# Patient Record
Sex: FEMALE | Race: BLACK OR AFRICAN AMERICAN | Employment: OTHER | URBAN - METROPOLITAN AREA
[De-identification: names, ages, dates, MRNs, and addresses within clinical notes are randomized per-mention and may not be internally consistent; named-entity substitution may affect disease eponyms.]

---

## 2019-07-08 ENCOUNTER — DOCUMENTATION ONLY (OUTPATIENT)
Dept: NEUROLOGY | Age: 34
End: 2019-07-08

## 2019-07-09 ENCOUNTER — OFFICE VISIT (OUTPATIENT)
Dept: NEUROLOGY | Age: 34
End: 2019-07-09

## 2019-07-09 VITALS
HEIGHT: 61 IN | SYSTOLIC BLOOD PRESSURE: 98 MMHG | DIASTOLIC BLOOD PRESSURE: 62 MMHG | HEART RATE: 87 BPM | TEMPERATURE: 98.4 F | OXYGEN SATURATION: 96 % | BODY MASS INDEX: 14.16 KG/M2 | WEIGHT: 75 LBS | RESPIRATION RATE: 16 BRPM

## 2019-07-09 DIAGNOSIS — G80.3 CEREBRAL PALSY, ATHETOID (HCC): Primary | ICD-10-CM

## 2019-07-09 RX ORDER — BACLOFEN 20 MG/1
TABLET ORAL
Refills: 4 | COMMUNITY
Start: 2019-06-18 | End: 2019-07-09 | Stop reason: SDUPTHER

## 2019-07-09 RX ORDER — CETIRIZINE HCL 10 MG
TABLET ORAL
COMMUNITY

## 2019-07-09 RX ORDER — DIAZEPAM 5 MG/1
5 TABLET ORAL
Qty: 90 TAB | Refills: 3 | Status: SHIPPED | OUTPATIENT
Start: 2019-07-09 | End: 2022-06-01 | Stop reason: SDUPTHER

## 2019-07-09 RX ORDER — SULFAMETHOXAZOLE AND TRIMETHOPRIM 800; 160 MG/1; MG/1
1 TABLET ORAL 2 TIMES DAILY
COMMUNITY

## 2019-07-09 RX ORDER — BACLOFEN 20 MG/1
TABLET ORAL
Qty: 90 TAB | Refills: 4 | Status: SHIPPED | OUTPATIENT
Start: 2019-07-09 | End: 2019-11-14 | Stop reason: SDUPTHER

## 2019-07-09 NOTE — PROGRESS NOTES
Chava Antonio is a 34 y.o., unknown handed female, with an established history of cerebral palsy. She doesn't have any seizures. 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 She has been getting Botox injections in all 4 limbs in the past but in the last couple of years only in the arms for her hygiene and dressing needs. The patient is a total care, with her mother performing all her ADLs. The patient is non-verbal and can communicate with blinking yes and no. She can operate her wheel chair with her head controls. she uses her own motorized chair. 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 Of note is she's been getting Botox injections for about 10 years. She's been getting the injections every 2 months for 2-3 years. Social History; single, doesn't smoke or drink. Disabled. Family History; mother alive and healthy. Father alive healthy. Siblings in good health. Current Outpatient Medications   Medication Sig Dispense Refill    baclofen (LIORESAL) 20 mg tablet TAKE 1 TABLET BY MOUTH THREE TIMES DAILY  4    trimethoprim-sulfamethoxazole (BACTRIM DS, SEPTRA DS) 160-800 mg per tablet Take 1 Tab by mouth two (2) times a day.  cetirizine (ZYRTEC) 10 mg tablet Take  by mouth. No past medical history on file. No past surgical history on file. Allergies   Allergen Reactions    Pcn [Penicillins] Hives       There is no problem list on file for this patient.         Review of Systems:   As above otherwise 11 point review of systems negative including;   Constitutional no fever or chills  Skin denies rash or itching  HENT  Denies tinnitus, hearing lose  Eyes denies diplopia vision lose  Respiratory denies shortness of breath  Cardiovascular denies chest pain, dyspnea on exertion  Gastrointestinal denies nausea, vomiting, diarrhea, constipation  Genitourinary denies incontinence  Musculoskeletal denies joint pain or swelling  Endocrine denies weight change  Hematology denies easy bruising or bleeding   Neurological as above in HPI      PHYSICAL EXAMINATION:      VITAL SIGNS:    Visit Vitals  BP 98/62 (BP 1 Location: Left arm, BP Patient Position: Sitting)   Pulse 87   Temp 98.4 °F (36.9 °C) (Oral)   Resp 16   Ht 5' 1\" (1.549 m)   Wt 34 kg (75 lb) Comment: wc, unable to weigh, mother stated   LMP 05/09/2019 (Within Weeks)   SpO2 96%   BMI 14.17 kg/m²       GENERAL: The patient is well developed, well nourished, and in no apparent distress. EXTREMITIES: No clubbing, cyanosis, or edema is identified. Pulses 2+ and symmetrical.  Muscle tone is abnormal, increased in all 4 limbs. HEAD:   Ear, nose, and throat appear to be without trauma. The patient is normocephalic. NEUROLOGIC EXAMINATION    MENTAL STATUS: The patient is awake, alert, and cooperative. She's non-verbal but easily follows 1 step commands. CRANIAL NERVES: II - Visual fields are full to confrontation. Funduscopic examination reveals flat disks bilaterally. Pupils are both 4 mm and briskly reactive to light and accommodation. III, IV, VI - Extraocular movements are intact and there is no nystagmus. V - Facial sensation is intact to pinprick and light touch. VII - Face is symmetrical.   VIII - Hearing is present. IX, X, XII- Palate rises symmetrically. Gag is present. Tongue is in the midline. XI - Shoulder shrugging and head turning intact  MOTOR:  The patient is wheelchair-bound. She has continuous choreoathetoid movements of her upper extremities and little and no movement of her lower extremities. She also has opisthotonic posturing of her head. Both of her upper extremities are essentially useless for movement. She has severe spasticity with flexion contractures at the elbow and extension contractures at the wrist.  The lower extremities are also noted to be spastic but a little less so. Muscle mass is decreased in all 4 extremities.   She also has a foreshortened left leg as a consequence of a hip removal due to osteo-myelitis in that region in the past.  Cerebellar and gait testing is impossible. 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 Seems relatively neurologically stable except that she has severe quadriparetic spasticity but makes dressing and delivery hygiene near possibility. She was given a baclofen pump for her lower extremity spasticity in the past but that became infected and needed to be removed in 2008. She continues to struggle with the spasticity. Plan: I am going to deliver Botox and require 500 units so I can deliver 450 units as per the protocol that was given to her in the past.  Apparently both brachialis muscles were delivered 120 units in both biceps brachii muscles were delivered 105 units for a total of 450 units of Botox being given. She will follow the protocol that was used in the past.  My only concern which I shared with the mom is that she was getting injections every 2 months not every 3 months. I think for now going to stick to every 3 months. She was given a refill of her baclofen. She was also given some Valium to help with the spasticity. I will see her back here for Botox injection. Thank you for allowing me to evaluate this unfortunate but pleasant individual.    PLEASE NOTE:   This document has been produced using voice recognition software. Unrecognized errors in transcription may be present.

## 2019-07-09 NOTE — PROGRESS NOTES
Carmenza Rivas is a 34 y.o. female new patient in today to establish care for cerebral palsy; referred by Malik Miller MD. Patient new to the area from [place]. Mother present at visit.

## 2019-07-09 NOTE — LETTER
7/9/19 Patient: Jessica Kelly YOB: 1985 Date of Visit: 7/9/2019 Viral Toledo MD 
[address] [facility] [address] [place] [address] VIA Facsimile: [phone] Dear Viral Toledo MD, Thank you for referring Ms. Jessica Kelly to [facility] for evaluation. My notes for this consultation are attached. If you have questions, please do not hesitate to call me. I look forward to following your patient along with you.  
 
 
Sincerely, 
 
Jaida Hudson MD

## 2019-07-17 ENCOUNTER — OFFICE VISIT (OUTPATIENT)
Dept: NEUROLOGY | Age: 34
End: 2019-07-17

## 2019-07-17 VITALS
SYSTOLIC BLOOD PRESSURE: 130 MMHG | OXYGEN SATURATION: 94 % | HEIGHT: 61 IN | BODY MASS INDEX: 14.16 KG/M2 | RESPIRATION RATE: 16 BRPM | WEIGHT: 75 LBS | DIASTOLIC BLOOD PRESSURE: 80 MMHG | HEART RATE: 67 BPM | TEMPERATURE: 98.7 F

## 2019-07-17 DIAGNOSIS — G81.10 SPASTIC HEMIPLEGIA DUE TO NONCEREBROVASCULAR ETIOLOGY, UNSPECIFIED LATERALITY (HCC): Primary | ICD-10-CM

## 2019-07-17 NOTE — PROGRESS NOTES
[address] AT [facility]  OFFICE PROCEDURE PROGRESS NOTE        Chart reviewed for the following:   Dariana Paredes MD, have reviewed the History, Physical and updated the Allergic reactions for KINGSTON Vásquez performed immediately prior to start of procedure:   Dariana Paredes MD, have performed the following reviews on Kyra Beck prior to the start of the procedure:            * Patient was identified by name and date of birth   * Agreement on procedure being performed was verified  * Risks and Benefits explained to the patient  * Procedure site verified and marked as necessary  * Patient was positioned for comfort  * Consent was signed and verified     Time: 2:15 PM    Date of procedure: 7/17/2019    Procedure performed by:  Brien Lagunas MD    Provider assisted by: the patient's mother     Patient assisted by: mother    How tolerated by patient: tolerated the procedure well with no complications    Post Procedural Pain Scale: 0 - No Hurt    Comments: none    Botox Procedure    Indications:  No diagnosis found. Last injection was April 2019, with relief, and now has a recurrence of spasticity of both upper limbs. Procedure: The medication was injected without EMG guidance as follows: Botox was prepared in the usual fashion using 1 mL of normal saline per 100 units of Botox. I reconstituted a total of 500 units of Botox. I injected the patient in both biceps on the right side and the left side with 60 units 2 injections each for a total of 120 units her biceps. I injected the left and right brachial radialis in one location with 50 and a second location with 55 units. A total of 105 units per brachioradialis. A total of 450 units of Botox was injected in a total of 50 units was wasted.   Outpatient Encounter Medications as of 7/17/2019   Medication Sig Dispense Refill    trimethoprim-sulfamethoxazole (BACTRIM DS, SEPTRA DS) 160-800 mg per tablet Take 1 Tab by mouth two (2) times a day.  cetirizine (ZYRTEC) 10 mg tablet Take  by mouth.  baclofen (LIORESAL) 20 mg tablet TAKE 1 TABLET BY MOUTH THREE TIMES DAILY 90 Tab 4    diazePAM (VALIUM) 5 mg tablet Take 1 Tab by mouth every six (6) hours as needed for Anxiety. Max Daily Amount: 20 mg. spasticity 90 Tab 3     No facility-administered encounter medications on file as of 7/17/2019.          The procedure was tolerated well with no complications

## 2019-07-17 NOTE — PROGRESS NOTES
Yara Lowe is a 30 y.o. female in today for Botox injections. 1. Have you been to the ER, urgent care clinic since your last visit? Hospitalized since your last visit? No    2. Have you seen or consulted any other health care providers outside of the [address] [place] [address] since your last visit? Include any pap smears or colon screening.  No

## 2019-08-13 ENCOUNTER — OFFICE VISIT (OUTPATIENT)
Dept: OBGYN CLINIC | Age: 34
End: 2019-08-13

## 2019-08-13 VITALS
OXYGEN SATURATION: 99 % | HEART RATE: 83 BPM | WEIGHT: 75 LBS | RESPIRATION RATE: 20 BRPM | HEIGHT: 61 IN | BODY MASS INDEX: 14.16 KG/M2

## 2019-08-13 DIAGNOSIS — R03.0 ELEVATED BLOOD PRESSURE READING IN OFFICE WITH WHITE COAT SYNDROME, WITHOUT DIAGNOSIS OF HYPERTENSION: ICD-10-CM

## 2019-08-13 DIAGNOSIS — N92.6 IRREGULAR PERIODS: Primary | ICD-10-CM

## 2019-08-13 RX ORDER — ACETAMINOPHEN 500 MG
1 TABLET ORAL DAILY
Qty: 1 KIT | Refills: 0 | Status: SHIPPED | OUTPATIENT
Start: 2019-08-13

## 2019-08-13 RX ORDER — NORGESTIMATE AND ETHINYL ESTRADIOL 0.25-0.035
1 KIT ORAL DAILY
Qty: 3 DOSE PACK | Refills: 3 | Status: SHIPPED | OUTPATIENT
Start: 2019-08-13 | End: 2019-09-10

## 2019-08-13 RX ORDER — NORGESTIMATE AND ETHINYL ESTRADIOL 0.25-0.035
1 KIT ORAL DAILY
COMMUNITY
End: 2019-08-13 | Stop reason: SDUPTHER

## 2019-08-13 NOTE — PROGRESS NOTES
30 minutes spent with patient and mom of which greater than 50 percent spent in counseling in regards to Trina's elevated blood pressure whenever she goes to a clinician's office and how this is termed white coat hypertension and is not considered pathologic when determining whether patient is an appropriate candidate for hormonal contraception to help regulate her irregular cycles. Will print out prescription for a wrist blood pressure cuff for mom to intermittently check patient's blood pressure for reassurance in continuing Sprintec therapy. Also counseled on the need to start cervical cancer screening despite no sexual activity and that patient should come back in one year for HPV indio screening and breast exam as mom would prefer the least invasive testing if possible that still suffices for accomplishing the age related cancer screening due to patient's neurological status.

## 2019-08-14 ENCOUNTER — OFFICE VISIT (OUTPATIENT)
Dept: NEUROLOGY | Age: 34
End: 2019-08-14

## 2019-08-14 VITALS
OXYGEN SATURATION: 98 % | WEIGHT: 75 LBS | DIASTOLIC BLOOD PRESSURE: 80 MMHG | SYSTOLIC BLOOD PRESSURE: 118 MMHG | BODY MASS INDEX: 14.16 KG/M2 | TEMPERATURE: 98.1 F | HEART RATE: 90 BPM | RESPIRATION RATE: 18 BRPM | HEIGHT: 61 IN

## 2019-08-14 DIAGNOSIS — G81.10 SPASTIC HEMIPLEGIA DUE TO NONCEREBROVASCULAR ETIOLOGY, UNSPECIFIED LATERALITY (HCC): Primary | ICD-10-CM

## 2019-08-14 DIAGNOSIS — G80.3 CEREBRAL PALSY, ATHETOID (HCC): ICD-10-CM

## 2019-08-14 NOTE — PROGRESS NOTES
Re:  Svitlana Ventura up visit     8/14/2019 11:54 AM    SSN: [id number]    Subjective:   Ruy Ledesma returns with her mom for follow up of her recent Botox injection. Mom did not notice any appreciable improvement in upper extremity spasticity. Medications:    Current Outpatient Medications   Medication Sig Dispense Refill    norgestimate-ethinyl estradiol (SPRINTEC, 28,) 0.25-35 mg-mcg tab Take 1 Tab by mouth daily for 28 days. 3 Dose Pack 3    Blood Pressure Test Kit-Wrist kit 1 Kit by Does Not Apply route daily. 1 Kit 0    trimethoprim-sulfamethoxazole (BACTRIM DS, SEPTRA DS) 160-800 mg per tablet Take 1 Tab by mouth two (2) times a day.  cetirizine (ZYRTEC) 10 mg tablet Take  by mouth.  baclofen (LIORESAL) 20 mg tablet TAKE 1 TABLET BY MOUTH THREE TIMES DAILY 90 Tab 4    diazePAM (VALIUM) 5 mg tablet Take 1 Tab by mouth every six (6) hours as needed for Anxiety. Max Daily Amount: 20 mg. spasticity 90 Tab 3       Vital signs:    Visit Vitals  /80 (BP 1 Location: Right arm, BP Patient Position: Sitting)   Pulse 90   Temp 98.1 °F (36.7 °C) (Oral)   Resp 18   Ht 5' 1\" (1.549 m)   Wt 34 kg (75 lb) Comment: wc, unable to stand, mother stated   LMP 08/13/2019 (Exact Date)   SpO2 98%   BMI 14.17 kg/m²       Review of Systems:   As above otherwise 11 point review of systems negative including;   Constitutional no fever or chills  Skin denies rash or itching  HEENT  Denies tinnitus, hearing lose  Eyes denies diplopia vision lose  Respiratory denies sortness of breath  Cardiovascular denies chest pain, dyspnea on exertion  Gastrointestinal denies nausea, vomiting, diarrhea, constipation  Genitourinary denies incontinence  Musculoskeletal denies joint pain or swelling  Endocrine denies weight change  Hematology denies easy bruising or bleeding   Neurological as above in HPI      There is no problem list on file for this patient.         Objective: MENTAL STATUS: The patient is awake, alert, and cooperative. She's non-verbal but easily follows 1 step commands. CRANIAL NERVES:   II - Visual fields are full to threat. Funduscopic examination reveals flat disks bilaterally. Pupils are both 4 mm and briskly reactive to light and accommodation. III, IV, VI - Extraocular movements are intact and there is no nystagmus. V - Facial sensation is intact to pinprick and light touch. VII - Face is symmetrical.   VIII - Hearing is present. IX, X, XII- Palate rises symmetrically. Gag is present. Tongue is in the midline. XI - Shoulder shrugging and head turning intact  MOTOR:          The patient is wheelchair-bound. She has continuous choreoathetoid movements of her upper extremities and little and no movement of her lower extremities. She also has opisthotonic posturing of her head. Both of her upper extremities are essentially useless for movement. She has severe spasticity with flexion contractures at the elbow and extension contractures at the wrist.  The lower extremities are also noted to be spastic but a little less so. Muscle mass is decreased in all 4 extremities. She also has a foreshortened left leg as a consequence of a hip removal due to osteo-myelitis in that region in the past.  Cerebellar and gait testing is impossible. CBC: No results found for: WBC, RBC, HGB, HCT, PLT, HGBEXT, HCTEXT, PLTEXT  BMP: No results found for: GLU, NA, K, CL, CO2, BUN, CREA, CA  CMP: No results found for: GLU, NA, K, CL, CO2, BUN, CREA, CA, AGAP, BUCR, TBIL, GPT, AP, TP, ALB, GLOB, AGRAT  Coagulation: No results found for: PTP, INR, APTT, PTTT    Assessment:  Severe quadriparetic cerebral palsy with spasticity of the arms. Botox wasn't helpful the last time. Plan: Will re-inject at 3 months. Sincerely,        Shashank Jung.  Delonte Ta M.D.

## 2019-09-18 ENCOUNTER — TELEPHONE (OUTPATIENT)
Dept: OBGYN CLINIC | Age: 34
End: 2019-09-18

## 2019-09-19 ENCOUNTER — TELEPHONE (OUTPATIENT)
Dept: OBGYN CLINIC | Age: 34
End: 2019-09-19

## 2019-09-19 NOTE — TELEPHONE ENCOUNTER
Malinda Slight calling Dr. Ortez Smooth office [phone] needing an office note from 08/13/19.     Fax [phone]

## 2019-10-16 ENCOUNTER — OFFICE VISIT (OUTPATIENT)
Dept: NEUROLOGY | Age: 34
End: 2019-10-16

## 2019-10-16 VITALS
WEIGHT: 75 LBS | BODY MASS INDEX: 14.16 KG/M2 | OXYGEN SATURATION: 96 % | TEMPERATURE: 98 F | DIASTOLIC BLOOD PRESSURE: 70 MMHG | HEART RATE: 68 BPM | SYSTOLIC BLOOD PRESSURE: 124 MMHG | RESPIRATION RATE: 20 BRPM | HEIGHT: 61 IN

## 2019-10-16 DIAGNOSIS — G81.10 SPASTIC HEMIPLEGIA DUE TO NONCEREBROVASCULAR ETIOLOGY, UNSPECIFIED LATERALITY (HCC): Primary | ICD-10-CM

## 2019-10-16 NOTE — PROGRESS NOTES
[address] AT [facility]  OFFICE PROCEDURE PROGRESS NOTE        Chart reviewed for the following:   Charlene Orozco MD, have reviewed the History, Physical and updated the Allergic reactions for KINGSTON Wren 75 performed immediately prior to start of procedure:   Charlene Orozco MD, have performed the following reviews on Brandan Torres prior to the start of the procedure:            * Patient was identified by name and date of birth   * Agreement on procedure being performed was verified  * Risks and Benefits explained to the patient  * Procedure site verified and marked as necessary  * Patient was positioned for comfort  * Consent was signed and verified     Time: 2:51 PM    Date of procedure: 10/16/2019    Procedure performed by:  Pauline Benedict MD    Provider assisted by: No one MA    Patient assisted by: self    How tolerated by patient: tolerated the procedure well with no complications    Post Procedural Pain Scale: 0 - No Hurt    Comments: none    Botox Procedure    Indications:  No diagnosis found. Last injection was July 2, 2019, with relief, and now has a recurrence of pain. Procedure: The medication was injected without EMG guidance as follows: Botox was prepared in usual fashion using 1 mL of normal saline per 100 units of Botox. I injected into the right and left biceps and 2 locations 60 units each for a total of 120 units. I injected the right and left brachial radialis and to location 6 units each. A total of 480 units were injected and 20 units were wasted. Outpatient Encounter Medications as of 10/16/2019   Medication Sig Dispense Refill    Blood Pressure Test Kit-Wrist kit 1 Kit by Does Not Apply route daily. 1 Kit 0    trimethoprim-sulfamethoxazole (BACTRIM DS, SEPTRA DS) 160-800 mg per tablet Take 1 Tab by mouth two (2) times a day.  cetirizine (ZYRTEC) 10 mg tablet Take  by mouth.       baclofen (LIORESAL) 20 mg tablet TAKE 1 TABLET BY MOUTH THREE TIMES DAILY 90 Tab 4    diazePAM (VALIUM) 5 mg tablet Take 1 Tab by mouth every six (6) hours as needed for Anxiety. Max Daily Amount: 20 mg. spasticity 90 Tab 3     No facility-administered encounter medications on file as of 10/16/2019.          The procedure was tolerated well with no complications

## 2019-11-14 DIAGNOSIS — G80.3 CEREBRAL PALSY, ATHETOID (HCC): ICD-10-CM

## 2019-11-14 NOTE — TELEPHONE ENCOUNTER
Requested Prescriptions     Pending Prescriptions Disp Refills    baclofen (LIORESAL) 20 mg tablet 90 Tab 4     Sig: TAKE 1 TABLET BY MOUTH THREE TIMES DAILY

## 2019-11-15 RX ORDER — BACLOFEN 20 MG/1
TABLET ORAL
Qty: 90 TAB | Refills: 4 | Status: SHIPPED | OUTPATIENT
Start: 2019-11-15 | End: 2020-03-26 | Stop reason: SDUPTHER

## 2020-02-18 ENCOUNTER — OFFICE VISIT (OUTPATIENT)
Dept: NEUROLOGY | Age: 35
End: 2020-02-18

## 2020-02-18 VITALS
WEIGHT: 75 LBS | OXYGEN SATURATION: 98 % | TEMPERATURE: 98.3 F | DIASTOLIC BLOOD PRESSURE: 92 MMHG | RESPIRATION RATE: 18 BRPM | HEART RATE: 77 BPM | HEIGHT: 61 IN | BODY MASS INDEX: 14.16 KG/M2 | SYSTOLIC BLOOD PRESSURE: 128 MMHG

## 2020-02-18 DIAGNOSIS — G80.3 CEREBRAL PALSY, ATHETOID (HCC): Primary | ICD-10-CM

## 2020-02-18 NOTE — PROGRESS NOTES
Suzie Redman is a 30 y.o. female in today accompanied by mother for Botox injections 500 units. 1. Have you been to the ER, urgent care clinic since your last visit? Hospitalized since your last visit? No    2. Have you seen or consulted any other health care providers outside of the [address] [place] [address] since your last visit? Include any pap smears or colon screening.  No

## 2020-02-18 NOTE — PROGRESS NOTES
[address] [facility]  OFFICE PROCEDURE PROGRESS NOTE        Chart reviewed for the following:   Fito Richardson MD, have reviewed the History, Physical and updated the Allergic reactions for KINGSTON Wren 75 performed immediately prior to start of procedure:   Fito Richardson MD, have performed the following reviews on Naye Marie prior to the start of the procedure:            * Patient was identified by name and date of birth   * Agreement on procedure being performed was verified  * Risks and Benefits explained to the patient  * Procedure site verified and marked as necessary  * Patient was positioned for comfort  * Consent was signed and verified     Time: 4:55 PM    Date of procedure: 2/18/2020    Procedure performed by:  Roseanne Yoo MD    Provider assisted by: No one     Patient assisted by: self    How tolerated by patient: tolerated the procedure well with no complications    Post Procedural Pain Scale: 0 - No Hurt    Comments: none    Botox Procedure    Indications:  No diagnosis found. Last injection was October 6, 2019, with relief, and now has a recurrence of severe spasticity of her upper extremities. Procedure: The medication was injected without EMG guidance as follows: Botox was prepared in usual fashion using 1 mL of normal saline per 100 units of Botox. I reconstituted a total of 500 units of Botox for this procedure. I injected both right and left biceps with 60 units in 2 locations for a total of 240 units. I injected both right and left brachial radialis with 55 units in 2 locations for a total of 220 units. A total of 460 units was injected and 40 units were wasted. The patient tolerated the procedure without any difficulty.   Outpatient Encounter Medications as of 2/18/2020   Medication Sig Dispense Refill    baclofen (LIORESAL) 20 mg tablet TAKE 1 TABLET BY MOUTH THREE TIMES DAILY 90 Tab 4    trimethoprim-sulfamethoxazole (BACTRIM DS, SEPTRA DS) 160-800 mg per tablet Take 1 Tab by mouth two (2) times a day.  cetirizine (ZYRTEC) 10 mg tablet Take  by mouth.  diazePAM (VALIUM) 5 mg tablet Take 1 Tab by mouth every six (6) hours as needed for Anxiety. Max Daily Amount: 20 mg. spasticity 90 Tab 3    Blood Pressure Test Kit-Wrist kit 1 Kit by Does Not Apply route daily. 1 Kit 0     No facility-administered encounter medications on file as of 2/18/2020.          The procedure was tolerated well with no complications

## 2020-03-26 DIAGNOSIS — G80.3 CEREBRAL PALSY, ATHETOID (HCC): ICD-10-CM

## 2020-03-26 RX ORDER — BACLOFEN 20 MG/1
TABLET ORAL
Qty: 90 TAB | Refills: 4 | Status: SHIPPED | OUTPATIENT
Start: 2020-03-26 | End: 2020-08-13 | Stop reason: SDUPTHER

## 2020-04-07 ENCOUNTER — VIRTUAL VISIT (OUTPATIENT)
Dept: OBGYN CLINIC | Age: 35
End: 2020-04-07

## 2020-04-07 DIAGNOSIS — N93.9 ABNORMAL UTERINE BLEEDING: Primary | ICD-10-CM

## 2020-04-07 DIAGNOSIS — B37.9 YEAST INFECTION: ICD-10-CM

## 2020-04-07 RX ORDER — FLUCONAZOLE 150 MG/1
150 TABLET ORAL DAILY
Qty: 3 TAB | Refills: 3 | Status: SHIPPED | OUTPATIENT
Start: 2020-04-07 | End: 2020-04-08

## 2020-04-07 RX ORDER — NORGESTIMATE AND ETHINYL ESTRADIOL 0.25-0.035
1 KIT ORAL DAILY
Qty: 4 PACKAGE | Refills: 4 | Status: SHIPPED | OUTPATIENT
Start: 2020-04-07

## 2020-04-07 NOTE — PROGRESS NOTES
Consent: Heron Mcduffie, who was seen by synchronous (real-time) audio-video technology, and/or her healthcare decision maker, is aware that this patient-initiated, Telehealth encounter on 4/7/2020 is a billable service, with coverage as determined by her insurance carrier. She is aware that she may receive a bill and has provided verbal consent to proceed: Yes. Assessment & Plan:   Diagnoses and all orders for this visit:    1. Abnormal uterine bleeding  -     norgestimate-ethinyl estradioL (ORTHO-CYCLEN, SPRINTEC) 0.25-35 mg-mcg tab; Take 1 Tab by mouth daily. Patient is to skip the placebo pills at the end of the pack and proceed directly to her new pack in order to take the oral contraception in a continuous fashion    2. Yeast infection  -     fluconazole (DIFLUCAN) 150 mg tablet; Take 1 Tab by mouth daily for 1 day. I spent at least 15 minutes with this established patient, and >50% of the time was spent counseling and/or coordinating care regarding her abnormal uterine bleeding and the fact that she would like to continue on the hormonal birth control pill to help control her cycles. PAtient states that she was doing great and would like to see if she could get a refill on her pills to be used in a continuous fashion and also to see if she can get a refill on her Diflucan to be used for a yeast infection. I cnfirmed with her that I will order both of these medications to her pharmacy. 712  Subjective:   Heron Mcduffie is a 30 y.o. female who was seen for Yeast Infection and Vaginitis      Prior to Admission medications    Medication Sig Start Date End Date Taking? Authorizing Provider   norgestimate-ethinyl estradioL (Ival Sicilian) 0.25-35 mg-mcg tab Take 1 Tab by mouth daily.  Patient is to skip the placebo pills at the end of the pack and proceed directly to her new pack in order to take the oral contraception in a continuous fashion 4/7/20  Yes Bel Martinez MD fluconazole (DIFLUCAN) 150 mg tablet Take 1 Tab by mouth daily for 1 day. 4/7/20 4/8/20 Yes Ava Rangel MD   baclofen (LIORESAL) 20 mg tablet TAKE 1 TABLET BY MOUTH THREE TIMES DAILY 3/26/20   Lencho Byrne MD   Blood Pressure Test Kit-Wrist kit 1 Kit by Does Not Apply route daily. 8/13/19   Estela Li MD   trimethoprim-sulfamethoxazole (BACTRIM DS, SEPTRA DS) 160-800 mg per tablet Take 1 Tab by mouth two (2) times a day. Provider, Historical   cetirizine (ZYRTEC) 10 mg tablet Take  by mouth. Provider, Historical   diazePAM (VALIUM) 5 mg tablet Take 1 Tab by mouth every six (6) hours as needed for Anxiety. Max Daily Amount: 20 mg. spasticity 7/9/19   Lencho Byrne MD     Allergies   Allergen Reactions    Pcn [Penicillins] Hives           Review of Systems   All other systems reviewed and are negative. Objective: There were no vitals taken for this visit. General: alert, cooperative, no distress   Mental  status: normal mood, behavior, speech, dress, motor activity, and thought processes, able to follow commands   HENT: NCAT   Neck: no visualized mass   Resp: no respiratory distress   Neuro: no gross deficits   Skin: no discoloration or lesions of concern on visible areas   Psychiatric: normal affect, consistent with stated mood, no evidence of hallucinations     Additional exam findings: We discussed the expected course, resolution and complications of the diagnosis(es) in detail. Medication risks, benefits, costs, interactions, and alternatives were discussed as indicated. I advised her to contact the office if her condition worsens, changes or fails to improve as anticipated. She expressed understanding with the diagnosis(es) and plan. Bao Dhillon is a 30 y.o. female being evaluated by a video visit encounter for concerns as above. A caregiver was present when appropriate.  Due to this being a TeleHealth encounter (During LXRKF-14 public health emergency), evaluation of the following organ systems was limited: Vitals/Constitutional/EENT/Resp/CV/GI//MS/Neuro/Skin/Heme-Lymph-Imm. Pursuant to the emergency declaration under the Formerly named [facility], [facility] waiver authority and the [employer] Act, this Virtual  Visit was conducted, with patient's (and/or legal guardian's) consent, to reduce the patient's risk of exposure to COVID-19 and provide necessary medical care. Services were provided through a video synchronous discussion virtually to substitute for in-person clinic visit. Patient and provider were located at their individual homes.         Alberto Hou MD

## 2020-05-18 ENCOUNTER — OFFICE VISIT (OUTPATIENT)
Dept: NEUROLOGY | Age: 35
End: 2020-05-18

## 2020-05-18 VITALS
RESPIRATION RATE: 22 BRPM | TEMPERATURE: 98.4 F | HEART RATE: 86 BPM | OXYGEN SATURATION: 99 % | WEIGHT: 75 LBS | HEIGHT: 61 IN | BODY MASS INDEX: 14.16 KG/M2

## 2020-05-18 DIAGNOSIS — M62.838 MUSCLE SPASTICITY: Primary | ICD-10-CM

## 2020-05-18 RX ORDER — ONABOTULINUMTOXINA 100 [USP'U]/1
300 INJECTION, POWDER, LYOPHILIZED, FOR SOLUTION INTRADERMAL; INTRAMUSCULAR ONCE
Qty: 300 UNITS | Refills: 0
Start: 2020-05-18 | End: 2020-05-18

## 2020-05-18 NOTE — PROGRESS NOTES
[address] AT [facility]  OFFICE PROCEDURE PROGRESS NOTE        Chart reviewed for the following:   Sheree Patterson MD, have reviewed the History, Physical and updated the Allergic reactions for KINGSTON Wren 75 performed immediately prior to start of procedure:   Sheree Patterson MD, have performed the following reviews on Juan Dove prior to the start of the procedure:            * Patient was identified by name and date of birth   * Agreement on procedure being performed was verified  * Risks and Benefits explained to the patient  * Procedure site verified and marked as necessary  * Patient was positioned for comfort  * Consent was signed and verified     Time: 1:32 PM    Date of procedure: 5/18/2020    Procedure performed by:  Lm Liu MD    Provider assisted by: No one     Patient assisted by: self    How tolerated by patient: tolerated the procedure well with no complications    Post Procedural Pain Scale: 0 - No Hurt    Comments: none  '  Botox Procedure    Indications:  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 Last injection was 2/20/2020 , with relief, and now has a recurrence of pain. Procedure: The medication was injected without EMG guidance as follows: Botox was prepared in usual fashion using 1 mL of normal saline per 100 units of Botox. I reconstituted a total of 500 units of Botox for this procedure. I injected both right and left biceps with 60 units in 2 locations for a total of 240 units. I injected both right and left brachial radialis with 55 units in 2 locations for a total of 220 units. A total of 460 units was injected and 40 units were wasted. The patient tolerated the procedure without any difficulty. Outpatient Encounter Medications as of 5/18/2020   Medication Sig Dispense Refill    norgestimate-ethinyl estradioL (ORTHO-CYCLEN, SPRINTEC) 0.25-35 mg-mcg tab Take 1 Tab by mouth daily.  Patient is to skip the placebo pills at the end of the pack and proceed directly to her new pack in order to take the oral contraception in a continuous fashion 4 Package 4    baclofen (LIORESAL) 20 mg tablet TAKE 1 TABLET BY MOUTH THREE TIMES DAILY 90 Tab 4    Blood Pressure Test Kit-Wrist kit 1 Kit by Does Not Apply route daily. 1 Kit 0    trimethoprim-sulfamethoxazole (BACTRIM DS, SEPTRA DS) 160-800 mg per tablet Take 1 Tab by mouth two (2) times a day.  cetirizine (ZYRTEC) 10 mg tablet Take  by mouth.  diazePAM (VALIUM) 5 mg tablet Take 1 Tab by mouth every six (6) hours as needed for Anxiety. Max Daily Amount: 20 mg. spasticity 90 Tab 3     No facility-administered encounter medications on file as of 5/18/2020.          The procedure was tolerated well with no complications

## 2020-08-10 ENCOUNTER — VIRTUAL VISIT (OUTPATIENT)
Dept: NEUROLOGY | Age: 35
End: 2020-08-10

## 2020-08-10 RX ORDER — TRIAMCINOLONE ACETONIDE 55 UG/1
1 SPRAY, METERED NASAL DAILY
COMMUNITY

## 2020-08-10 RX ORDER — LORATADINE 10 MG/1
10 TABLET ORAL DAILY
COMMUNITY

## 2020-08-10 NOTE — PROGRESS NOTES
Amy Hwang is a 37 y.o. female who's mother Juan Ramon Sepulveda will be conducting the patient virtual visit. 1. Have you been to the ER, urgent care clinic since your last visit? Hospitalized since your last visit? No    2. Have you seen or consulted any other health care providers outside of the [address] [place] [address] since your last visit? Include any pap smears or colon screening.  No        The cell phone number she'll be using is [phone]

## 2020-08-12 ENCOUNTER — DOCUMENTATION ONLY (OUTPATIENT)
Dept: NEUROLOGY | Age: 35
End: 2020-08-12

## 2020-08-12 NOTE — PROGRESS NOTES
Are you continuing patient's previous therapy of Botox 300 units as last treated by Dr. Donnita Goldmann?

## 2020-08-13 ENCOUNTER — OFFICE VISIT (OUTPATIENT)
Dept: NEUROLOGY | Age: 35
End: 2020-08-13

## 2020-08-13 VITALS
HEART RATE: 72 BPM | TEMPERATURE: 98.4 F | DIASTOLIC BLOOD PRESSURE: 80 MMHG | RESPIRATION RATE: 18 BRPM | BODY MASS INDEX: 14.16 KG/M2 | SYSTOLIC BLOOD PRESSURE: 130 MMHG | HEIGHT: 61 IN | WEIGHT: 75 LBS

## 2020-08-13 DIAGNOSIS — G80.0 SPASTIC QUADRIPARESIS SECONDARY TO CEREBRAL PALSY (HCC): ICD-10-CM

## 2020-08-13 DIAGNOSIS — G80.3 CEREBRAL PALSY, ATHETOID (HCC): Primary | ICD-10-CM

## 2020-08-13 RX ORDER — ONABOTULINUMTOXINA 100 [USP'U]/1
500 INJECTION, POWDER, LYOPHILIZED, FOR SOLUTION INTRADERMAL; INTRAMUSCULAR ONCE
Qty: 200 UNITS | Refills: 0
Start: 2020-08-13 | End: 2020-08-13

## 2020-08-13 RX ORDER — BACLOFEN 20 MG/1
TABLET ORAL
Qty: 270 TAB | Refills: 3 | Status: SHIPPED | OUTPATIENT
Start: 2020-08-13 | End: 2021-06-03 | Stop reason: SDUPTHER

## 2020-08-13 NOTE — PROGRESS NOTES
Armani Montes presents today for   Chief Complaint   Patient presents with    Spasms     follow up    Medication Refill     request 90 day supply Baclofen       Is someone accompanying this pt? Mom,    Is the patient using any DME equipment during OV? no    Depression Screening:  No flowsheet data found. Learning Assessment:  No flowsheet data found. Abuse Screening:  No flowsheet data found. Fall Risk  No flowsheet data found. Coordination of Care:  1. Have you been to the ER, urgent care clinic since your last visit? Hospitalized since your last visit? no    2. Have you seen or consulted any other health care providers outside of the [address] [place] [address] since your last visit? Include any pap smears or colon screening.  no

## 2020-08-13 NOTE — PROGRESS NOTES
2020 3:16 PM    SSN: [id number]    Subjective:   72-year-old female who has been a patient of Dr. 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 For approximately 3 years she has been getting botulinum toxin injection initially in all 4 limbs but lately in the arms and more specifically in the biceps and brachial radialis because she has an extreme flexion of both elbows that make it very difficult for her family/mother to help her with hygiene and dressing needs. She is communicative only by blinking, she is full care, and moves around in a motorized wheelchair. According to the mother the injections helped tremendously with the dressing, otherwise is this extremely difficult. Her last set of injections was in May and she started to get stiff again. Last time here she was injected as followed:    60 units into different locations in the right and left biceps for a total of 240 units  55 units in 2 different locations in the brachial radialis per arm for a total of 220 units  Total injected was 460 with 40 minutes of waste. This was not done under EMG guidance.     Social History     Socioeconomic History    Marital status: SINGLE     Spouse name: Not on file    Number of children: Not on file    Years of education: Not on file    Highest education level: Not on file   Occupational History    Not on file   Social Needs    Financial resource strain: Not on file    Food insecurity     Worry: Not on file     Inability: Not on file    Transportation needs     Medical: Not on file     Non-medical: Not on file   Tobacco Use    Smoking status: Never Smoker    Smokeless tobacco: Never Used   Substance and Sexual Activity    Alcohol use: Never     Frequency: Never    Drug use: Never    Sexual activity: Never   Lifestyle    Physical activity     Days per week: Not on file     Minutes per session: Not on file    Stress: Not on file   Relationships    Social connections     Talks on phone: Not on file     Gets together: Not on file     Attends Muslim service: Not on file     Active member of club or organization: Not on file     Attends meetings of clubs or organizations: Not on file     Relationship status: Not on file    Intimate partner violence     Fear of current or ex partner: Not on file     Emotionally abused: Not on file     Physically abused: Not on file     Forced sexual activity: Not on file   Other Topics Concern    Not on file   Social History Narrative    Not on file       History reviewed. No pertinent family history. Current Outpatient Medications   Medication Sig Dispense Refill    baclofen (LIORESAL) 20 mg tablet TAKE 1 TABLET BY MOUTH THREE TIMES DAILY 270 Tab 3    onabotulinumtoxinA (Botox) 100 unit injection 500 Units by IntraMUSCular route once for 1 dose. 200 Units 0    loratadine (CLARITIN) 10 mg tablet Take 10 mg by mouth daily.  triamcinolone (NASACORT AQ) 55 mcg nasal inhaler 1 [place] by Nasal route daily.  norgestimate-ethinyl estradioL (ORTHO-CYCLEN, SPRINTEC) 0.25-35 mg-mcg tab Take 1 Tab by mouth daily. Patient is to skip the placebo pills at the end of the pack and proceed directly to her new pack in order to take the oral contraception in a continuous fashion 4 Package 4    Blood Pressure Test Kit-Wrist kit 1 Kit by Does Not Apply route daily. 1 Kit 0    trimethoprim-sulfamethoxazole (BACTRIM DS, SEPTRA DS) 160-800 mg per tablet Take 1 Tab by mouth two (2) times a day.  cetirizine (ZYRTEC) 10 mg tablet Take  by mouth.  diazePAM (VALIUM) 5 mg tablet Take 1 Tab by mouth every six (6) hours as needed for Anxiety.  Max Daily Amount: 20 mg. spasticity 90 Tab 3       Past Medical History:   Diagnosis Date    Spasticity        Past Surgical History:   Procedure Laterality Date    HX ORTHOPAEDIC      hip removal       Allergies   Allergen Reactions    Pcn [Penicillins] Hives       Vital signs:    Visit Vitals  /80 (BP 1 Location: Right arm, BP Patient Position: Sitting)   Pulse 72   Temp 98.4 °F (36.9 °C)   Resp 18   Ht 5' 1\" (1.549 m)   Wt 34 kg (75 lb)   BMI 14.17 kg/m²       Review of Systems:   Unobtainable    EXAM: Alert, in NAD. Heart is regular. Quadriparetic, nonverbal, marked spasticity in all 4 limbs, with a very prominent spasticity on both elbows which are in a very tight and flexed position and difficult to extend with flexion contractures of the biceps tendon noticeable. 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 I will refill the latter. I will proceed with Botox injections with EMG guidance with the dose as mentioned above once we have all the necessary supplies and medications here. This will likely happen next week. PLEASE NOTE:   Portions of this document may have been produced using voice recognition software. Unrecognized errors in transcription may be present. This note will not be viewable in [address]

## 2020-08-27 ENCOUNTER — OFFICE VISIT (OUTPATIENT)
Dept: NEUROLOGY | Age: 35
End: 2020-08-27

## 2020-08-27 VITALS
RESPIRATION RATE: 16 BRPM | DIASTOLIC BLOOD PRESSURE: 68 MMHG | WEIGHT: 75 LBS | HEART RATE: 84 BPM | HEIGHT: 61 IN | TEMPERATURE: 98.1 F | BODY MASS INDEX: 14.16 KG/M2 | SYSTOLIC BLOOD PRESSURE: 110 MMHG

## 2020-08-27 DIAGNOSIS — G81.10 SPASTIC HEMIPLEGIA DUE TO NONCEREBROVASCULAR ETIOLOGY, UNSPECIFIED LATERALITY (HCC): Primary | ICD-10-CM

## 2020-08-27 DIAGNOSIS — G80.0 SPASTIC QUADRIPARESIS SECONDARY TO CEREBRAL PALSY (HCC): ICD-10-CM

## 2020-08-27 RX ORDER — ONABOTULINUMTOXINA 100 [USP'U]/1
500 INJECTION, POWDER, LYOPHILIZED, FOR SOLUTION INTRADERMAL; INTRAMUSCULAR ONCE
Qty: 100 UNITS | Refills: 0
Start: 2020-08-27 | End: 2020-08-27

## 2020-08-27 NOTE — PROGRESS NOTES
Botox Procedure    Indications: Bilateral elbow flexor spasticity    Procedure: Botulinum toxin type A 500 units to bilateral elbow flexors as below. After informed consent and following aseptic techniques 500 units of Botulinum Toxin Type A were diluted at 100units/2 cc of nss and injected UNDER EMG GUIDANCE USING A EMG INOJECT HOLLOW NEEDLE:    BICEPS   120 units divided in 2 sites on each arm (bilateral total 240 units)  BRACHIORADIALIS  110 units divided in 2 sites on each arm (bilateral total 220 units)    The procedure was tolerated well with no complications     40 units wasted. [address] AT [facility]  OFFICE PROCEDURE PROGRESS NOTE        Chart reviewed for the following:   Rudy Wolf MD, have reviewed the History, Physical and updated the Allergic reactions for Wenda Leer performed immediately prior to start of procedure:   Rudy Wolf MD, have performed the following reviews on [facility] prior to the start of the procedure:            * Patient was identified by name and date of birth   * Agreement on procedure being performed was verified  * Risks and Benefits explained to the patient  * Procedure site verified and marked as necessary  * Patient was positioned for comfort  * Consent was signed and verified     Time: 12:38 PM    Date of procedure: 8/27/2020    Procedure performed by:  Alex Smith MD    Provider assisted by: None    Patient assisted by: self    How tolerated by patient: tolerated the procedure well with no complications    Post Procedural Pain Scale: 0 - No Hurt    Comments: none    Lot [id number]: [id number]  Expires 04/2023   [address]  NDC: 5051-7797-59  Dosage: 460 units    Patient will be given a f/u appt in two months with Dr. Lyubov Mao.

## 2020-08-27 NOTE — PROGRESS NOTES
Amy Arias presents today for   Chief Complaint   Patient presents with    Procedure     Botox injections       Is someone accompanying this pt? Yes, mother    Is the patient using any DME equipment during [address]? no    Depression Screening:  No flowsheet data found. Learning Assessment:  No flowsheet data found. Abuse Screening:  No flowsheet data found. Fall Risk  No flowsheet data found. Coordination of Care:  1. Have you been to the ER, urgent care clinic since your last visit? Hospitalized since your last visit? no    2. Have you seen or consulted any other health care providers outside of the [address] [place] [address] since your last visit? Include any pap smears or colon screening.  no

## 2020-10-19 ENCOUNTER — OFFICE VISIT (OUTPATIENT)
Dept: NEUROLOGY | Age: 35
End: 2020-10-19
Payer: MEDICARE

## 2020-10-19 VITALS
TEMPERATURE: 96.1 F | HEIGHT: 61 IN | HEART RATE: 55 BPM | BODY MASS INDEX: 14.16 KG/M2 | WEIGHT: 75 LBS | SYSTOLIC BLOOD PRESSURE: 90 MMHG | RESPIRATION RATE: 22 BRPM | DIASTOLIC BLOOD PRESSURE: 70 MMHG | OXYGEN SATURATION: 73 %

## 2020-10-19 DIAGNOSIS — G80.3 CEREBRAL PALSY, ATHETOID (HCC): Primary | ICD-10-CM

## 2020-10-19 DIAGNOSIS — G80.0 SPASTIC QUADRIPARESIS SECONDARY TO CEREBRAL PALSY (HCC): ICD-10-CM

## 2020-10-19 PROCEDURE — 99213 OFFICE O/P EST LOW 20 MIN: CPT | Performed by: STUDENT IN AN ORGANIZED HEALTH CARE EDUCATION/TRAINING PROGRAM

## 2020-10-19 NOTE — PROGRESS NOTES
Cee Kelly is a 37 y.o. female . presents for Follow-up (spasticity)   . A 37years old female patient with cerebral palsy  spastic quadriparesis and nonverbal status here for follow-up evaluation. Used to follow with Dr. Celia Brown in the past for Botox injections. She was seen by Dr. Flory Suresh during her last visit. Her last Botox injection was on August 27, 2020. She was brought today by her mother. According to her mom, she used to get Botox since early childhood. The past used to get it over her upper or lower extremities. But recently she is getting it only over the upper extremities. She was given injections last time her biceps and brachial rectus muscles bilaterally. Used to get good relief since the past and able to stretch out her arms bilaterally. But after her last injection, mother mentions that her hyper extremity spasticity is about the same. No difficulty swallowing and no choking spells. Review of Systems   Reason unable to perform ROS: Patient is non verbal.   Constitutional: Negative for chills and fever. HENT: Negative for hearing loss. Eyes: Negative for blurred vision (tracks). Respiratory: Negative for cough and shortness of breath. Gastrointestinal: Negative for nausea and vomiting. Genitourinary:        ON diapers     Musculoskeletal:        Stiff UEs   Skin: Negative for itching and rash. Neurological: Positive for focal weakness (UE and LE). Negative for tremors and seizures. Past Medical History:   Diagnosis Date    Spasticity        Past Surgical History:   Procedure Laterality Date    HX ORTHOPAEDIC      hip removal        History reviewed. No pertinent family history.      Social History     Socioeconomic History    Marital status: SINGLE     Spouse name: Not on file    Number of children: Not on file    Years of education: Not on file    Highest education level: Not on file   Occupational History    Not on file   Social Needs    Financial resource strain: Not on file    Food insecurity     Worry: Not on file     Inability: Not on file    Transportation needs     Medical: Not on file     Non-medical: Not on file   Tobacco Use    Smoking status: Never Smoker    Smokeless tobacco: Never Used   Substance and Sexual Activity    Alcohol use: Never     Frequency: Never    Drug use: Never    Sexual activity: Never   Lifestyle    Physical activity     Days per week: Not on file     Minutes per session: Not on file    Stress: Not on file   Relationships    Social connections     Talks on phone: Not on file     Gets together: Not on file     Attends [employer] service: Not on file     Active member of club or organization: Not on file     Attends meetings of clubs or organizations: Not on file     Relationship status: Not on file    Intimate partner violence     Fear of current or ex partner: Not on file     Emotionally abused: Not on file     Physically abused: Not on file     Forced sexual activity: Not on file   Other Topics Concern    Not on file   Social History Narrative    Not on file        Allergies   Allergen Reactions    Pcn [Penicillins] Hives         Current Outpatient Medications   Medication Sig Dispense Refill    baclofen (LIORESAL) 20 mg tablet TAKE 1 TABLET BY MOUTH THREE TIMES DAILY 270 Tab 3    loratadine (CLARITIN) 10 mg tablet Take 10 mg by mouth daily.  triamcinolone (NASACORT AQ) 55 mcg nasal inhaler 1 [place] by Nasal route daily.  norgestimate-ethinyl estradioL (ORTHO-CYCLEN, SPRINTEC) 0.25-35 mg-mcg tab Take 1 Tab by mouth daily. Patient is to skip the placebo pills at the end of the pack and proceed directly to her new pack in order to take the oral contraception in a continuous fashion 4 Package 4    trimethoprim-sulfamethoxazole (BACTRIM DS, SEPTRA DS) 160-800 mg per tablet Take 1 Tab by mouth two (2) times a day.  cetirizine (ZYRTEC) 10 mg tablet Take  by mouth.       diazePAM (VALIUM) 5 mg tablet Take 1 Tab by mouth every six (6) hours as needed for Anxiety. Max Daily Amount: 20 mg. spasticity 90 Tab 3    Blood Pressure Test Kit-Wrist kit 1 Kit by Does Not Apply route daily. 1 Kit 0         Physical Exam  HENT:      Head: Normocephalic. Mouth/Throat:      Mouth: Mucous membranes are moist.      Pharynx: Oropharynx is clear. No oropharyngeal exudate. Pulmonary:      Effort: Pulmonary effort is normal. No respiratory distress. Musculoskeletal:      Comments: Spastic quadriparesis with contractures of the upper extremities at the elbow and wrist   Neurological:      Mental Status: She is alert. Comments: Mental status: Nonverbal; alert, unable to answer orientation questions; follows simple commands (opening her mouth and sticking out her tongue) . Speech and languge: Mute; following simple commands. CN: BTT bilaterally; ,EOMI, PERRL, , no facial asymmetry noted, palate elevation symmetric bilat, SS+SCM 5/5 , tongue midline  Motor: Spastic upper and lower extremities; some movement of the upper extremities and lower extremities. Coordination: FNF, HS accurate w/o dysmetria  DTR: Difficult to get because of contracture. Gait: Wheelchair bound. No visits with results within 3 Month(s) from this visit. Latest known visit with results is:   No results found for any previous visit. ICD-10-CM ICD-9-CM    1. Cerebral palsy, athetoid (HCC)  G80.3 333.71    2. 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 She has been on long-term Botox treatment for spasticity. Last Botox injection was August 27, 2020. She is getting the Botox over her upper extremities. The total last the dose was 500 units. Used to get good responses in terms of stretching out her arms after her Botox. Her next dose is in late November.   We will see her in 5 weeks time for the Botox.

## 2020-11-23 ENCOUNTER — OFFICE VISIT (OUTPATIENT)
Dept: NEUROLOGY | Age: 35
End: 2020-11-23
Payer: MEDICARE

## 2020-11-23 VITALS
WEIGHT: 75 LBS | HEIGHT: 61 IN | RESPIRATION RATE: 16 BRPM | HEART RATE: 88 BPM | TEMPERATURE: 97.2 F | DIASTOLIC BLOOD PRESSURE: 60 MMHG | BODY MASS INDEX: 14.16 KG/M2 | SYSTOLIC BLOOD PRESSURE: 102 MMHG

## 2020-11-23 DIAGNOSIS — G80.0 SPASTIC QUADRIPARESIS SECONDARY TO CEREBRAL PALSY (HCC): Primary | ICD-10-CM

## 2020-11-23 PROCEDURE — G0463 HOSPITAL OUTPT CLINIC VISIT: HCPCS | Performed by: STUDENT IN AN ORGANIZED HEALTH CARE EDUCATION/TRAINING PROGRAM

## 2020-11-23 PROCEDURE — 64642 CHEMODENERV 1 EXTREMITY 1-4: CPT | Performed by: STUDENT IN AN ORGANIZED HEALTH CARE EDUCATION/TRAINING PROGRAM

## 2020-11-23 RX ORDER — ONABOTULINUMTOXINA 100 [USP'U]/1
200 INJECTION, POWDER, LYOPHILIZED, FOR SOLUTION INTRADERMAL; INTRAMUSCULAR ONCE
Qty: 200 UNITS | Refills: 0
Start: 2020-11-23 | End: 2020-11-23

## 2020-11-23 NOTE — PROGRESS NOTES
Vineet Vines presents today for   Chief Complaint   Patient presents with    Procedure     Botox injections       Is someone accompanying this pt? Yes, Mother    Is the patient using any DME equipment during [address]? no    Depression Screening:  No flowsheet data found. Learning Assessment:  No flowsheet data found. Abuse Screening:  No flowsheet data found. Fall Risk  No flowsheet data found. Coordination of Care:  1. Have you been to the ER, urgent care clinic since your last visit? Hospitalized since your last visit? no    2. Have you seen or consulted any other health care providers outside of the [address] [place] [address] since your last visit? Include any pap smears or colon screening.  no

## 2020-11-24 NOTE — PROGRESS NOTES
[address] AT [facility]  OFFICE PROCEDURE PROGRESS NOTE        Chart reviewed for the following:   Kanchan Obrien MD, have reviewed the History, Physical and updated the Allergic reactions for K[place]ST[place] Wren 75 performed immediately prior to start of procedure:   Kanchan Obrien MD, have performed the following reviews on Asha Thorpe prior to the start of the procedure:            * Patient was identified by name and date of birth   * Agreement on procedure being performed was verified  * Risks and Benefits explained to the patient  * Procedure site verified and marked as necessary  * Patient was positioned for comfort  * Consent was signed and verified     Time: 12:00 PM    Date of procedure: 11/23/2020    Procedure performed by:  Zach Siegel MD    Provider assisted by: No one     Patient assisted by: Mother    How tolerated by patient: tolerated the procedure well with no complications    Post Procedural Pain Scale: 0 - No Hurt    Comments: none    Lot Number: [id number](200 Unit vials) and [id number] of the 100 units vials  Expires 08/2023(200 units) and 06/2023 of the 100 unit. [facility]  NDC: 1470-0498-43(200unit vials) and [id number]. Dosage: 460 units  '  Botox Procedure    Indications:  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 Last injection was 8/27/2020. Mother claims that she didn't see any benefit from the last time injection with no significant change in the elbow spasticity. Takes PRN diazepam for worsening symptoms. Will increase the Biceps dose and will give her a total of 500 units as she has no significant side effect from the previous 460 mg doses. Procedure: The medication was injected without EMG guidance as follows: Botox was prepared in usual fashion using 1 mL of normal saline per 100 units of Botox(used 2 200 unit Botox and 1 100 unit botox).   I reconstituted a total of 500 units of Botox for this procedure. I injected both right and left biceps with 50 units in 4 locations(each) for a total of 400 units. I injected both right and left brachial radialis with 50 units in 2 locations for a total of 100 units. A total of 500 units was injected. The patient tolerated the procedure without any difficulty. Outpatient Encounter Medications as of 11/23/2020   Medication Sig Dispense Refill    baclofen (LIORESAL) 20 mg tablet TAKE 1 TABLET BY MOUTH THREE TIMES DAILY 270 Tab 3    loratadine (CLARITIN) 10 mg tablet Take 10 mg by mouth daily.  triamcinolone (NASACORT AQ) 55 mcg nasal inhaler 1 [place] by Nasal route daily.  norgestimate-ethinyl estradioL (ORTHO-CYCLEN, SPRINTEC) 0.25-35 mg-mcg tab Take 1 Tab by mouth daily. Patient is to skip the placebo pills at the end of the pack and proceed directly to her new pack in order to take the oral contraception in a continuous fashion 4 Package 4    Blood Pressure Test Kit-Wrist kit 1 Kit by Does Not Apply route daily. 1 Kit 0    trimethoprim-sulfamethoxazole (BACTRIM DS, SEPTRA DS) 160-800 mg per tablet Take 1 Tab by mouth two (2) times a day.  cetirizine (ZYRTEC) 10 mg tablet Take  by mouth.  diazePAM (VALIUM) 5 mg tablet Take 1 Tab by mouth every six (6) hours as needed for Anxiety. Max Daily Amount: 20 mg. spasticity 90 Tab 3     No facility-administered encounter medications on file as of 11/23/2020.          The procedure was tolerated well with no complications

## 2021-03-02 ENCOUNTER — OFFICE VISIT (OUTPATIENT)
Dept: NEUROLOGY | Age: 36
End: 2021-03-02
Payer: MEDICARE

## 2021-03-02 VITALS
HEART RATE: 109 BPM | BODY MASS INDEX: 14.16 KG/M2 | TEMPERATURE: 99.5 F | HEIGHT: 61 IN | WEIGHT: 75 LBS | OXYGEN SATURATION: 99 % | RESPIRATION RATE: 22 BRPM

## 2021-03-02 DIAGNOSIS — G80.0 SPASTIC QUADRIPARESIS SECONDARY TO CEREBRAL PALSY (HCC): Primary | ICD-10-CM

## 2021-03-02 PROCEDURE — G8427 DOCREV CUR MEDS BY ELIG CLIN: HCPCS | Performed by: STUDENT IN AN ORGANIZED HEALTH CARE EDUCATION/TRAINING PROGRAM

## 2021-03-02 PROCEDURE — G8419 CALC BMI OUT NRM PARAM NOF/U: HCPCS | Performed by: STUDENT IN AN ORGANIZED HEALTH CARE EDUCATION/TRAINING PROGRAM

## 2021-03-02 PROCEDURE — G8432 DEP SCR NOT DOC, RNG: HCPCS | Performed by: STUDENT IN AN ORGANIZED HEALTH CARE EDUCATION/TRAINING PROGRAM

## 2021-03-02 PROCEDURE — 64645 CHEMODENERV 1 EXTREM 5/> EA: CPT | Performed by: STUDENT IN AN ORGANIZED HEALTH CARE EDUCATION/TRAINING PROGRAM

## 2021-03-02 PROCEDURE — G0463 HOSPITAL OUTPT CLINIC VISIT: HCPCS | Performed by: STUDENT IN AN ORGANIZED HEALTH CARE EDUCATION/TRAINING PROGRAM

## 2021-03-04 RX ORDER — ONABOTULINUMTOXINA 100 [USP'U]/1
200 INJECTION, POWDER, LYOPHILIZED, FOR SOLUTION INTRADERMAL; INTRAMUSCULAR ONCE
Qty: 200 UNITS | Refills: 0 | Status: SHIPPED | OUTPATIENT
Start: 2021-03-04 | End: 2021-03-04 | Stop reason: SDUPTHER

## 2021-03-04 RX ORDER — ONABOTULINUMTOXINA 100 [USP'U]/1
200 INJECTION, POWDER, LYOPHILIZED, FOR SOLUTION INTRADERMAL; INTRAMUSCULAR ONCE
Qty: 200 UNITS | Refills: 0
Start: 2021-03-04 | End: 2021-03-04 | Stop reason: SDUPTHER

## 2021-03-05 NOTE — PROGRESS NOTES
[address] AT [facility]  OFFICE PROCEDURE PROGRESS NOTE        Chart reviewed for the following:   Manda Vincent MD, have reviewed the History, Physical and updated the Allergic reactions for KINGSTON Vásquez performed immediately prior to start of procedure:   Manda Vincent MD, have performed the following reviews on Mar Halsted prior to the start of the procedure:            * Patient was identified by name and date of birth   * Agreement on procedure being performed was verified  * Risks and Benefits explained to the patient  * Procedure site verified and marked as necessary  * Patient was positioned for comfort  * Consent was signed and verified     Time: 12:00 PM    Date of procedure: 3/4/2021    Procedure performed by:  Megan Epstein MD    Provider assisted by: No one     Patient assisted by: Mother    How tolerated by patient: tolerated the procedure well with no complications    Post Procedural Pain Scale: 0 - No Hurt    Comments: none    NDC: 6790-2752-33(200unit vials) and 8829-3159-12. 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 Last injection was 8/27/2020. Mother claims that She improves and moves her elbows and fingers with the injections at the elbow flexors. Some worsening spasticity before her next injections. Takes PRN diazepam for worsening symptoms. She was given a total of 500 units during her last visit; no major side effects. Procedure: The medication was injected without EMG guidance as follows: Botox was prepared in usual fashion using 1 mL of normal saline per 100 units of Botox(used 2 200 unit Botox and 1 100 unit botox). Reconstituted a total of 500 units of Botox for this procedure. I injected both right and left biceps with 50 units in 4 locations(each) for a total of 400 units.   I injected both right and left brachial radialis with 50 units in 2 locations for a total of 100 units. A total of 500 units was injected. The patient tolerated the procedure without any difficulty. Outpatient Encounter Medications as of 3/2/2021   Medication Sig Dispense Refill    baclofen (LIORESAL) 20 mg tablet TAKE 1 TABLET BY MOUTH THREE TIMES DAILY 270 Tab 3    loratadine (CLARITIN) 10 mg tablet Take 10 mg by mouth daily.  triamcinolone (NASACORT AQ) 55 mcg nasal inhaler 1 [place] by Nasal route daily.  norgestimate-ethinyl estradioL (ORTHO-CYCLEN, SPRINTEC) 0.25-35 mg-mcg tab Take 1 Tab by mouth daily. Patient is to skip the placebo pills at the end of the pack and proceed directly to her new pack in order to take the oral contraception in a continuous fashion 4 Package 4    Blood Pressure Test Kit-Wrist kit 1 Kit by Does Not Apply route daily. 1 Kit 0    trimethoprim-sulfamethoxazole (BACTRIM DS, SEPTRA DS) 160-800 mg per tablet Take 1 Tab by mouth two (2) times a day.  cetirizine (ZYRTEC) 10 mg tablet Take  by mouth.  diazePAM (VALIUM) 5 mg tablet Take 1 Tab by mouth every six (6) hours as needed for Anxiety. Max Daily Amount: 20 mg. spasticity 90 Tab 3     No facility-administered encounter medications on file as of 3/2/2021.          The procedure was tolerated well with no complications

## 2021-06-03 ENCOUNTER — OFFICE VISIT (OUTPATIENT)
Dept: NEUROLOGY | Age: 36
End: 2021-06-03
Payer: MEDICARE

## 2021-06-03 ENCOUNTER — DOCUMENTATION ONLY (OUTPATIENT)
Dept: NEUROLOGY | Age: 36
End: 2021-06-03

## 2021-06-03 VITALS
DIASTOLIC BLOOD PRESSURE: 64 MMHG | TEMPERATURE: 98.1 F | HEIGHT: 61 IN | WEIGHT: 75 LBS | OXYGEN SATURATION: 95 % | SYSTOLIC BLOOD PRESSURE: 90 MMHG | RESPIRATION RATE: 22 BRPM | HEART RATE: 83 BPM | BODY MASS INDEX: 14.16 KG/M2

## 2021-06-03 DIAGNOSIS — G80.0 SPASTIC QUADRIPARESIS SECONDARY TO CEREBRAL PALSY (HCC): Primary | ICD-10-CM

## 2021-06-03 DIAGNOSIS — G80.3 CEREBRAL PALSY, ATHETOID (HCC): ICD-10-CM

## 2021-06-03 PROCEDURE — 64643 CHEMODENERV 1 EXTREM 1-4 EA: CPT | Performed by: STUDENT IN AN ORGANIZED HEALTH CARE EDUCATION/TRAINING PROGRAM

## 2021-06-03 PROCEDURE — G8427 DOCREV CUR MEDS BY ELIG CLIN: HCPCS | Performed by: STUDENT IN AN ORGANIZED HEALTH CARE EDUCATION/TRAINING PROGRAM

## 2021-06-03 PROCEDURE — 64642 CHEMODENERV 1 EXTREMITY 1-4: CPT | Performed by: STUDENT IN AN ORGANIZED HEALTH CARE EDUCATION/TRAINING PROGRAM

## 2021-06-03 PROCEDURE — 96372 THER/PROPH/DIAG INJ SC/IM: CPT | Performed by: STUDENT IN AN ORGANIZED HEALTH CARE EDUCATION/TRAINING PROGRAM

## 2021-06-03 PROCEDURE — G8432 DEP SCR NOT DOC, RNG: HCPCS | Performed by: STUDENT IN AN ORGANIZED HEALTH CARE EDUCATION/TRAINING PROGRAM

## 2021-06-03 PROCEDURE — G8419 CALC BMI OUT NRM PARAM NOF/U: HCPCS | Performed by: STUDENT IN AN ORGANIZED HEALTH CARE EDUCATION/TRAINING PROGRAM

## 2021-06-03 PROCEDURE — G0463 HOSPITAL OUTPT CLINIC VISIT: HCPCS | Performed by: STUDENT IN AN ORGANIZED HEALTH CARE EDUCATION/TRAINING PROGRAM

## 2021-06-03 RX ORDER — BACLOFEN 20 MG/1
TABLET ORAL
Qty: 270 TABLET | Refills: 2 | Status: SHIPPED | OUTPATIENT
Start: 2021-06-03 | End: 2021-08-23 | Stop reason: SDUPTHER

## 2021-06-03 RX ORDER — ONABOTULINUMTOXINA 100 [USP'U]/1
500 INJECTION, POWDER, LYOPHILIZED, FOR SOLUTION INTRADERMAL; INTRAMUSCULAR ONCE
Qty: 200 UNITS | Refills: 0
Start: 2021-06-03 | End: 2021-06-03

## 2021-06-03 NOTE — PROGRESS NOTES
[address] AT [facility]  OFFICE PROCEDURE PROGRESS NOTE        Chart reviewed for the following:   Angeli Tyson MD, have reviewed the History, Physical and updated the Allergic reactions for KINGSTON Wren 75 performed immediately prior to start of procedure:   Angeli Tyson MD, have performed the following reviews on Roger Byrne prior to the start of the procedure:            * Patient was identified by name and date of birth   * Agreement on procedure being performed was verified  * Risks and Benefits explained to the patient  * Procedure site verified and marked as necessary  * Patient was positioned for comfort  * Consent was signed and verified     Time: 11:20 Am    Date of procedure: 6/3/2021    Procedure performed by:  Milly Moyer MD    Provider assisted by: No one     Patient assisted by: Mother    How tolerated by patient: tolerated the procedure well with no complications    Post Procedural Pain Scale: 0 - No Hurt    Comments: none    '  Botox Procedure    Indications:  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 Last injection was 8/27/2020. Mother claims that she can move her upper extremities at the elbow better after her Botox injections and tends to get more and more stiffer a couple of weeks before her next injection. On exam, her upper extremities are held in flexion at the elbow, wrist, fingers. Some difficulty extending them. Lower extremities also are stiff with slight movement. Patient nonverbal but follows some commands. In addition to Botox, she is on baclofen 20 mg p.o. 3 times daily. She is getting a total of 500 units of Botox over the upper extremities over the brachioradialis and biceps. Review of Systems   Reason unable to perform ROS: Limted by her clinical condition. Constitutional: Negative for chills and fever. HENT: Negative for hearing loss. Eyes: Negative for blurred vision.    Respiratory: Negative for cough and shortness of breath. Gastrointestinal: Negative for vomiting. Musculoskeletal:        Spastic quadriparesis   Skin: Negative for itching and rash. Neurological: Positive for weakness (UE and LE). Procedure: The medication was injected without EMG guidance as follows: Botox was prepared in usual fashion using 1 mL of normal saline per 50 units of Botox(used 2 of 200 unit Botox and 1 of 100 unit botox). Reconstituted a total of 500 units of Botox for this procedure. I injected both right and left biceps with 50 units in 4 locations(each) for a total of 400 units. I injected both right and left brachial radialis with 50 units in 4 locations for a total of 100 units. A total of 500 units was injected. The patient tolerated the procedure without any difficulty. Outpatient Encounter Medications as of 6/3/2021   Medication Sig Dispense Refill    baclofen (LIORESAL) 20 mg tablet TAKE 1 TABLET BY MOUTH THREE TIMES DAILY 270 Tab 3    loratadine (CLARITIN) 10 mg tablet Take 10 mg by mouth daily.  triamcinolone (NASACORT AQ) 55 mcg nasal inhaler 1 [place] by Nasal route daily.  norgestimate-ethinyl estradioL (ORTHO-CYCLEN, SPRINTEC) 0.25-35 mg-mcg tab Take 1 Tab by mouth daily. Patient is to skip the placebo pills at the end of the pack and proceed directly to her new pack in order to take the oral contraception in a continuous fashion 4 Package 4    Blood Pressure Test Kit-Wrist kit 1 Kit by Does Not Apply route daily. 1 Kit 0    trimethoprim-sulfamethoxazole (BACTRIM DS, SEPTRA DS) 160-800 mg per tablet Take 1 Tab by mouth two (2) times a day.  cetirizine (ZYRTEC) 10 mg tablet Take  by mouth.  diazePAM (VALIUM) 5 mg tablet Take 1 Tab by mouth every six (6) hours as needed for Anxiety. Max Daily Amount: 20 mg. spasticity 90 Tab 3     No facility-administered encounter medications on file as of 6/3/2021.         The procedure was tolerated well with no complications

## 2021-08-23 ENCOUNTER — OFFICE VISIT (OUTPATIENT)
Dept: NEUROLOGY | Age: 36
End: 2021-08-23
Payer: MEDICARE

## 2021-08-23 VITALS
SYSTOLIC BLOOD PRESSURE: 110 MMHG | HEIGHT: 61 IN | DIASTOLIC BLOOD PRESSURE: 66 MMHG | RESPIRATION RATE: 16 BRPM | HEART RATE: 96 BPM | OXYGEN SATURATION: 96 % | WEIGHT: 75 LBS | BODY MASS INDEX: 14.16 KG/M2

## 2021-08-23 DIAGNOSIS — G80.3 CEREBRAL PALSY, ATHETOID (HCC): ICD-10-CM

## 2021-08-23 DIAGNOSIS — G80.0 SPASTIC QUADRIPARESIS SECONDARY TO CEREBRAL PALSY (HCC): Primary | ICD-10-CM

## 2021-08-23 PROCEDURE — G8427 DOCREV CUR MEDS BY ELIG CLIN: HCPCS | Performed by: STUDENT IN AN ORGANIZED HEALTH CARE EDUCATION/TRAINING PROGRAM

## 2021-08-23 PROCEDURE — G8432 DEP SCR NOT DOC, RNG: HCPCS | Performed by: STUDENT IN AN ORGANIZED HEALTH CARE EDUCATION/TRAINING PROGRAM

## 2021-08-23 PROCEDURE — G0463 HOSPITAL OUTPT CLINIC VISIT: HCPCS | Performed by: STUDENT IN AN ORGANIZED HEALTH CARE EDUCATION/TRAINING PROGRAM

## 2021-08-23 PROCEDURE — 64643 CHEMODENERV 1 EXTREM 1-4 EA: CPT | Performed by: STUDENT IN AN ORGANIZED HEALTH CARE EDUCATION/TRAINING PROGRAM

## 2021-08-23 PROCEDURE — 64642 CHEMODENERV 1 EXTREMITY 1-4: CPT | Performed by: STUDENT IN AN ORGANIZED HEALTH CARE EDUCATION/TRAINING PROGRAM

## 2021-08-23 PROCEDURE — G8419 CALC BMI OUT NRM PARAM NOF/U: HCPCS | Performed by: STUDENT IN AN ORGANIZED HEALTH CARE EDUCATION/TRAINING PROGRAM

## 2021-08-23 RX ORDER — BACLOFEN 20 MG/1
TABLET ORAL
Qty: 270 TABLET | Refills: 2 | Status: SHIPPED | OUTPATIENT
Start: 2021-08-23 | End: 2022-06-01 | Stop reason: SDUPTHER

## 2021-08-23 RX ORDER — ONABOTULINUMTOXINA 100 [USP'U]/1
500 INJECTION, POWDER, LYOPHILIZED, FOR SOLUTION INTRADERMAL; INTRAMUSCULAR ONCE
Qty: 200 UNITS | Refills: 0 | Status: SHIPPED | OUTPATIENT
Start: 2021-08-23 | End: 2021-08-23

## 2021-08-23 RX ORDER — ONABOTULINUMTOXINA 100 [USP'U]/1
200 INJECTION, POWDER, LYOPHILIZED, FOR SOLUTION INTRADERMAL; INTRAMUSCULAR ONCE
Qty: 200 UNITS | Refills: 0
Start: 2021-08-23 | End: 2021-08-23

## 2021-08-23 NOTE — PROGRESS NOTES
[address] AT [facility]  OFFICE PROCEDURE PROGRESS NOTE        Chart reviewed for the following:   Staci Villarreal MD, have reviewed the History, Physical and updated the Allergic reactions for R Johan Wren 75 performed immediately prior to start of procedure:   Staci Villarreal MD, have performed the following reviews on Estelita Knife prior to the start of the procedure:            * Patient was identified by name and date of birth   * Agreement on procedure being performed was verified  * Risks and Benefits explained to the patient  * Procedure site verified and marked as necessary  * Patient was positioned for comfort  * Consent was signed and verified     Time: 10:00 Am    Date of procedure: 8/23/2021     Lot Number for the 200 unit vials: [id number]  [address] [address]  NDC: 3269-7050-58    Lot Number for the 100 unit vial: [id number]  [facility] [address].  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 Last injection was on Yarely 3,2021. Botox helps but the UE stiffness gets worse before the next one. On exam, her upper extremities are held in flexion at the elbow, wrist, fingers. Some difficulty extending them. Lower extremities also are stiff with slight movement. Patient nonverbal but follows some commands. In addition to Botox, she is on baclofen 20 mg p.o. 3 times daily and refilled it. No significant reaction from her botox  She is getting a total of 500 units of Botox over the upper extremities over the brachioradialis and biceps. Procedure: The medication was injected without EMG guidance as follows: Botox was prepared in usual fashion using 1 mL of normal saline per 50 units of Botox(used 2 of 200 unit Botox and 1 of 100 unit botox). Reconstituted a total of 500 units of Botox for this procedure. I injected both right and left biceps with 50 units in 4 locations(each) for a total of 400 units. I injected both right and left brachial radialis with 50 units in 4 locations for a total of 100 units. A total of 500 units was injected. The patient tolerated the procedure without any difficulty. Outpatient Encounter Medications as of 8/23/2021   Medication Sig Dispense Refill    baclofen (LIORESAL) 20 mg tablet TAKE 1 TABLET BY MOUTH THREE TIMES DAILY 270 Tablet 2    onabotulinumtoxinA (Botox) 100 unit injection 200 Units by IntraMUSCular route once for 1 dose. 200 Units 0    loratadine (CLARITIN) 10 mg tablet Take 10 mg by mouth daily.  triamcinolone (NASACORT AQ) 55 mcg nasal inhaler 1 [place] by Nasal route daily.  norgestimate-ethinyl estradioL (ORTHO-CYCLEN, SPRINTEC) 0.25-35 mg-mcg tab Take 1 Tab by mouth daily. Patient is to skip the placebo pills at the end of the pack and proceed directly to her new pack in order to take the oral contraception in a continuous fashion 4 Package 4    Blood Pressure Test Kit-Wrist kit 1 Kit by Does Not Apply route daily. 1 Kit 0    trimethoprim-sulfamethoxazole (BACTRIM DS, SEPTRA DS) 160-800 mg per tablet Take 1 Tab by mouth two (2) times a day.  cetirizine (ZYRTEC) 10 mg tablet Take  by mouth.  diazePAM (VALIUM) 5 mg tablet Take 1 Tab by mouth every six (6) hours as needed for Anxiety. Max Daily Amount: 20 mg. spasticity 90 Tab 3    [DISCONTINUED] baclofen (LIORESAL) 20 mg tablet TAKE 1 TABLET BY MOUTH THREE TIMES DAILY 270 Tablet 2     No facility-administered encounter medications on file as of 8/23/2021.

## 2021-11-19 ENCOUNTER — OFFICE VISIT (OUTPATIENT)
Dept: NEUROLOGY | Age: 36
End: 2021-11-19
Payer: MEDICARE

## 2021-11-19 VITALS
DIASTOLIC BLOOD PRESSURE: 50 MMHG | BODY MASS INDEX: 14.16 KG/M2 | RESPIRATION RATE: 16 BRPM | HEIGHT: 61 IN | HEART RATE: 88 BPM | WEIGHT: 75 LBS | SYSTOLIC BLOOD PRESSURE: 90 MMHG

## 2021-11-19 DIAGNOSIS — G80.0 SPASTIC QUADRIPARESIS SECONDARY TO CEREBRAL PALSY (HCC): Primary | ICD-10-CM

## 2021-11-19 DIAGNOSIS — G80.3 CEREBRAL PALSY, ATHETOID (HCC): ICD-10-CM

## 2021-11-19 PROCEDURE — G8419 CALC BMI OUT NRM PARAM NOF/U: HCPCS | Performed by: STUDENT IN AN ORGANIZED HEALTH CARE EDUCATION/TRAINING PROGRAM

## 2021-11-19 PROCEDURE — G0463 HOSPITAL OUTPT CLINIC VISIT: HCPCS | Performed by: STUDENT IN AN ORGANIZED HEALTH CARE EDUCATION/TRAINING PROGRAM

## 2021-11-19 PROCEDURE — G8432 DEP SCR NOT DOC, RNG: HCPCS | Performed by: STUDENT IN AN ORGANIZED HEALTH CARE EDUCATION/TRAINING PROGRAM

## 2021-11-19 PROCEDURE — 64642 CHEMODENERV 1 EXTREMITY 1-4: CPT | Performed by: STUDENT IN AN ORGANIZED HEALTH CARE EDUCATION/TRAINING PROGRAM

## 2021-11-19 PROCEDURE — G8427 DOCREV CUR MEDS BY ELIG CLIN: HCPCS | Performed by: STUDENT IN AN ORGANIZED HEALTH CARE EDUCATION/TRAINING PROGRAM

## 2021-11-19 RX ORDER — ONABOTULINUMTOXINA 100 [USP'U]/1
500 INJECTION, POWDER, LYOPHILIZED, FOR SOLUTION INTRADERMAL; INTRAMUSCULAR ONCE
Qty: 200 UNITS | Refills: 0
Start: 2021-11-19 | End: 2021-11-19

## 2021-11-19 NOTE — PROGRESS NOTES
[address] AT [facility]  OFFICE PROCEDURE PROGRESS NOTE        Chart reviewed for the following:   Nena Mckeon MD, have reviewed the History, Physical and updated the Allergic reactions for KINGSTON Wren 75 performed immediately prior to start of procedure:   Nena Mckeon MD, have performed the following reviews on Bucky Merritt prior to the start of the procedure:            * Patient was identified by name and date of birth   * Agreement on procedure being performed was verified  * Risks and Benefits explained to the patient  * Procedure site verified and marked as necessary  * Patient was positioned for comfort  * Consent was signed and verified     Time: 1:00 PM    Date of procedure: 11/19/2021     Lot Number for the 200 unit vials: [id number]  Expires 06/2024  [address] 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 Last injection was on Yarely 3,2021. Botox helps but the UE stiffness gets worse before the next one. On exam, her upper extremities are held in flexion at the elbow, wrist, fingers. Some difficulty extending them. Lower extremities also are stiff with slight movement. Patient nonverbal but follows some commands. In addition to Botox, she is on baclofen 20 mg p.o. 3 times daily and refilled it. No significant reaction from her botox  She is getting a total of 500 units of Botox over the upper extremities over the brachioradialis and biceps.  No major side effects from previous injections. Procedure: The medication was injected without EMG guidance as follows: Botox was prepared in usual fashion using 1 mL of normal saline per 50 units of Botox(used 2 of 200 unit Botox and 1 of 100 unit botox). Reconstituted a total of 500 units of Botox for this procedure. I injected both right and left biceps with 50 units in 4 locations(each) for a total of 400 units. I injected both right and left brachial radialis with 50 units in 4 locations for a total of 100 units. A total of 500 units was injected. The patient tolerated the procedure without any difficulty. Outpatient Encounter Medications as of 11/19/2021   Medication Sig Dispense Refill    onabotulinumtoxinA (Botox) 100 unit injection 500 Units by IntraMUSCular route once for 1 dose. 200 Units 0    baclofen (LIORESAL) 20 mg tablet TAKE 1 TABLET BY MOUTH THREE TIMES DAILY 270 Tablet 2    loratadine (CLARITIN) 10 mg tablet Take 10 mg by mouth daily.  triamcinolone (NASACORT AQ) 55 mcg nasal inhaler 1 [place] by Nasal route daily.  norgestimate-ethinyl estradioL (ORTHO-CYCLEN, SPRINTEC) 0.25-35 mg-mcg tab Take 1 Tab by mouth daily. Patient is to skip the placebo pills at the end of the pack and proceed directly to her new pack in order to take the oral contraception in a continuous fashion 4 Package 4    Blood Pressure Test Kit-Wrist kit 1 Kit by Does Not Apply route daily. 1 Kit 0    trimethoprim-sulfamethoxazole (BACTRIM DS, SEPTRA DS) 160-800 mg per tablet Take 1 Tab by mouth two (2) times a day.  cetirizine (ZYRTEC) 10 mg tablet Take  by mouth.  diazePAM (VALIUM) 5 mg tablet Take 1 Tab by mouth every six (6) hours as needed for Anxiety. Max Daily Amount: 20 mg. spasticity 90 Tab 3     No facility-administered encounter medications on file as of 11/19/2021.

## 2022-02-23 ENCOUNTER — OFFICE VISIT (OUTPATIENT)
Dept: NEUROLOGY | Age: 37
End: 2022-02-23
Payer: MEDICARE

## 2022-02-23 VITALS
BODY MASS INDEX: 14.16 KG/M2 | RESPIRATION RATE: 18 BRPM | HEART RATE: 120 BPM | HEIGHT: 61 IN | WEIGHT: 75 LBS | SYSTOLIC BLOOD PRESSURE: 98 MMHG | DIASTOLIC BLOOD PRESSURE: 66 MMHG | OXYGEN SATURATION: 99 %

## 2022-02-23 DIAGNOSIS — G80.0 SPASTIC QUADRIPARESIS SECONDARY TO CEREBRAL PALSY (HCC): Primary | ICD-10-CM

## 2022-02-23 PROCEDURE — G8432 DEP SCR NOT DOC, RNG: HCPCS | Performed by: STUDENT IN AN ORGANIZED HEALTH CARE EDUCATION/TRAINING PROGRAM

## 2022-02-23 PROCEDURE — G8427 DOCREV CUR MEDS BY ELIG CLIN: HCPCS | Performed by: STUDENT IN AN ORGANIZED HEALTH CARE EDUCATION/TRAINING PROGRAM

## 2022-02-23 PROCEDURE — 64642 CHEMODENERV 1 EXTREMITY 1-4: CPT | Performed by: STUDENT IN AN ORGANIZED HEALTH CARE EDUCATION/TRAINING PROGRAM

## 2022-02-23 PROCEDURE — G8419 CALC BMI OUT NRM PARAM NOF/U: HCPCS | Performed by: STUDENT IN AN ORGANIZED HEALTH CARE EDUCATION/TRAINING PROGRAM

## 2022-02-23 PROCEDURE — G0463 HOSPITAL OUTPT CLINIC VISIT: HCPCS | Performed by: STUDENT IN AN ORGANIZED HEALTH CARE EDUCATION/TRAINING PROGRAM

## 2022-02-23 RX ORDER — ONABOTULINUMTOXINA 100 [USP'U]/1
400 INJECTION, POWDER, LYOPHILIZED, FOR SOLUTION INTRADERMAL; INTRAMUSCULAR ONCE
Qty: 400 UNITS | Refills: 0
Start: 2022-02-23 | End: 2022-02-23

## 2022-02-23 RX ORDER — ONABOTULINUMTOXINA 100 [USP'U]/1
200 INJECTION, POWDER, LYOPHILIZED, FOR SOLUTION INTRADERMAL; INTRAMUSCULAR ONCE
Qty: 200 UNITS | Refills: 0
Start: 2022-02-23 | End: 2022-02-23

## 2022-02-23 NOTE — PROGRESS NOTES
[address] AT [facility]  OFFICE PROCEDURE PROGRESS NOTE        Chart reviewed for the following:   Clarice Boyce MD, have reviewed the History, Physical and updated the Allergic reactions for KINGSTON Vásquez performed immediately prior to start of procedure:   Clarice Boyce MD, have performed the following reviews on Zeny Thornton prior to the start of the procedure:            * Patient was identified by name and date of birth   * Agreement on procedure being performed was verified  * Risks and Benefits explained to the patient  * Procedure site verified and marked as necessary  * Patient was positioned for comfort  * Consent was signed and verified     Time: 11:48 AM    Date of procedure: 2/23/2022     Lot Number for the 200 unit vials: [id number]  [address]  NDC: 7050-4132-61    Dosage: 400 units        Procedure performed by:  Shahnaz Herring MD    Provider assisted by: No one     Patient assisted by: Father    How tolerated by patient: tolerated the procedure well with no complications          '  Botox Procedure    Indications:  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 Last injection was on Yarely 3,2021. Botox helps but the UE stiffness gets worse before the next one. On exam, her upper extremities are held in flexion at the elbow, wrist, fingers. Some difficulty extending them. Lower extremities also are stiff with slight movement. Patient nonverbal but follows some commands. In addition to Botox, she is on baclofen 20 mg p.o. 3 times daily and refilled it. No significant reaction from her botox  She is getting a total of 500 units of Botox over the upper extremities over the brachioradialis and biceps. No major side effects from previous injections. Procedure:  Low-dose of Botox was decreased to 400 mg from her previous dose of 500.   We will see the effect in the next 3 months. The medication was injected without EMG guidance as follows: Botox was prepared in usual fashion using 1 mL of normal saline per 50 units of Botox(used 2 of 200 unit Botox). Reconstituted a total of 400 units of Botox for this procedure. I injected both right and left biceps with 40 units in 4 locations(each) for a total of 320 units. I injected both right and left brachial radialis with 40 units in 2 locations for a total of 80 units. A total of 400 units was injected. The patient tolerated the procedure without any difficulty. Outpatient Encounter Medications as of 2/23/2022   Medication Sig Dispense Refill    onabotulinumtoxinA (Botox) 100 unit injection 200 Units by IntraMUSCular route once for 1 dose. 200 Units 0    onabotulinumtoxinA (Botox) 100 unit injection 400 Units by IntraMUSCular route once for 1 dose. 400 Units 0    baclofen (LIORESAL) 20 mg tablet TAKE 1 TABLET BY MOUTH THREE TIMES DAILY 270 Tablet 2    loratadine (CLARITIN) 10 mg tablet Take 10 mg by mouth daily.  triamcinolone (NASACORT AQ) 55 mcg nasal inhaler 1 [place] by Nasal route daily.  norgestimate-ethinyl estradioL (ORTHO-CYCLEN, SPRINTEC) 0.25-35 mg-mcg tab Take 1 Tab by mouth daily. Patient is to skip the placebo pills at the end of the pack and proceed directly to her new pack in order to take the oral contraception in a continuous fashion 4 Package 4    Blood Pressure Test Kit-Wrist kit 1 Kit by Does Not Apply route daily. 1 Kit 0    trimethoprim-sulfamethoxazole (BACTRIM DS, SEPTRA DS) 160-800 mg per tablet Take 1 Tab by mouth two (2) times a day.  cetirizine (ZYRTEC) 10 mg tablet Take  by mouth.  diazePAM (VALIUM) 5 mg tablet Take 1 Tab by mouth every six (6) hours as needed for Anxiety. Max Daily Amount: 20 mg. spasticity 90 Tab 3     No facility-administered encounter medications on file as of 2/23/2022.

## 2022-06-01 ENCOUNTER — OFFICE VISIT (OUTPATIENT)
Dept: NEUROLOGY | Age: 37
End: 2022-06-01
Payer: MEDICARE

## 2022-06-01 VITALS
OXYGEN SATURATION: 96 % | HEIGHT: 61 IN | RESPIRATION RATE: 20 BRPM | SYSTOLIC BLOOD PRESSURE: 112 MMHG | HEART RATE: 104 BPM | DIASTOLIC BLOOD PRESSURE: 70 MMHG | TEMPERATURE: 98.2 F | BODY MASS INDEX: 14.17 KG/M2

## 2022-06-01 DIAGNOSIS — G80.0 SPASTIC QUADRIPARESIS SECONDARY TO CEREBRAL PALSY (HCC): Primary | ICD-10-CM

## 2022-06-01 DIAGNOSIS — G80.3 CEREBRAL PALSY, ATHETOID (HCC): ICD-10-CM

## 2022-06-01 PROCEDURE — 64642 CHEMODENERV 1 EXTREMITY 1-4: CPT | Performed by: STUDENT IN AN ORGANIZED HEALTH CARE EDUCATION/TRAINING PROGRAM

## 2022-06-01 PROCEDURE — 64643 CHEMODENERV 1 EXTREM 1-4 EA: CPT | Performed by: STUDENT IN AN ORGANIZED HEALTH CARE EDUCATION/TRAINING PROGRAM

## 2022-06-01 PROCEDURE — G0463 HOSPITAL OUTPT CLINIC VISIT: HCPCS | Performed by: STUDENT IN AN ORGANIZED HEALTH CARE EDUCATION/TRAINING PROGRAM

## 2022-06-01 RX ORDER — ONABOTULINUMTOXINA 100 [USP'U]/1
320 INJECTION, POWDER, LYOPHILIZED, FOR SOLUTION INTRADERMAL; INTRAMUSCULAR ONCE
Qty: 320 UNITS | Refills: 0
Start: 2022-06-01 | End: 2022-06-01

## 2022-06-01 RX ORDER — BACLOFEN 20 MG/1
TABLET ORAL
Qty: 270 TABLET | Refills: 5 | Status: SHIPPED | OUTPATIENT
Start: 2022-06-01

## 2022-06-01 RX ORDER — DIAZEPAM 5 MG/1
5 TABLET ORAL
Qty: 20 TABLET | Refills: 3 | Status: SHIPPED | OUTPATIENT
Start: 2022-06-01

## 2022-06-01 RX ORDER — ONABOTULINUMTOXINA 100 [USP'U]/1
200 INJECTION, POWDER, LYOPHILIZED, FOR SOLUTION INTRADERMAL; INTRAMUSCULAR ONCE
Qty: 200 UNITS | Refills: 0 | Status: CANCELLED
Start: 2022-06-01 | End: 2022-06-01

## 2022-06-01 NOTE — PROGRESS NOTES
[address] AT [facility]  OFFICE PROCEDURE PROGRESS NOTE        Chart reviewed for the following:   Dionicio Freitas MD, have reviewed the History, Physical and updated the Allergic reactions for KINGSTON Vásquez performed immediately prior to start of procedure:   Dionicio Freitas MD, have performed the following reviews on Cherry Dmitriy prior to the start of the procedure:            * Patient was identified by name and date of birth   * Agreement on procedure being performed was verified  * Risks and Benefits explained to the patient  * Procedure site verified and marked as necessary  * Patient was positioned for comfort  * Consent was signed and verified     Time: 10:30 AM    Date of procedure: 6/1/2022     Lot Number : [id number]  Expires 01/2025  Manufacture: [id number]  NDC: 5614-7135-12    Dosage: 320 units        Procedure performed by:  Nadege Del Real MD    Provider assisted by: No one     Patient assisted by: Mother    How tolerated by patient: tolerated the procedure well with no complications          '  Botox Procedure    Indications:  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 Last injection was in Feb,2022. Botox helps but the UE stiffness gets worse before the next one. On exam, her upper extremities are held in flexion at the elbow, wrist, fingers. Some difficulty extending them. Lower extremities also are stiff with slight movement. Patient nonverbal but follows some commands. In addition to Botox, she is on baclofen 20 mg p.o. 3 times daily and refilled it. No significant reaction from her botox  She is getting a total of 320 units of Botox over the upper extremities over the brachioradialis and biceps. No major side effects from previous injections. Procedure:  A total of 320 units of Botox was used.    The medication was injected without EMG guidance as follows: Botox was prepared in usual fashion using 1 mL of normal saline per 50 units of Botox(used 2 of 200 unit Botox). Reconstituted a total of 400 units of Botox for this procedure. I injected both right and left biceps with 30 units in 4 locations(each) for a total of 240 units. I injected both right and left brachial radialis with 20 units in 2 locations each for a total of 80 units. A total of 320 units was injected. The patient tolerated the procedure without any difficulty. Outpatient Encounter Medications as of 6/1/2022   Medication Sig Dispense Refill    baclofen (LIORESAL) 20 mg tablet TAKE 1 TABLET BY MOUTH THREE TIMES DAILY 270 Tablet 2    loratadine (CLARITIN) 10 mg tablet Take 10 mg by mouth daily. triamcinolone (NASACORT AQ) 55 mcg nasal inhaler 1 [place] by Nasal route daily. norgestimate-ethinyl estradioL (ORTHO-CYCLEN, SPRINTEC) 0.25-35 mg-mcg tab Take 1 Tab by mouth daily. Patient is to skip the placebo pills at the end of the pack and proceed directly to her new pack in order to take the oral contraception in a continuous fashion 4 Package 4    Blood Pressure Test Kit-Wrist kit 1 Kit by Does Not Apply route daily. 1 Kit 0    trimethoprim-sulfamethoxazole (BACTRIM DS, SEPTRA DS) 160-800 mg per tablet Take 1 Tab by mouth two (2) times a day. cetirizine (ZYRTEC) 10 mg tablet Take  by mouth. diazePAM (VALIUM) 5 mg tablet Take 1 Tab by mouth every six (6) hours as needed for Anxiety. Max Daily Amount: 20 mg. spasticity 90 Tab 3     No facility-administered encounter medications on file as of 6/1/2022.

## 2022-08-31 ENCOUNTER — OFFICE VISIT (OUTPATIENT)
Dept: NEUROLOGY | Age: 37
End: 2022-08-31
Payer: MEDICARE

## 2022-08-31 VITALS
DIASTOLIC BLOOD PRESSURE: 70 MMHG | SYSTOLIC BLOOD PRESSURE: 124 MMHG | OXYGEN SATURATION: 99 % | HEART RATE: 76 BPM | RESPIRATION RATE: 22 BRPM | HEIGHT: 61 IN | TEMPERATURE: 99.4 F | BODY MASS INDEX: 14.17 KG/M2

## 2022-08-31 DIAGNOSIS — G80.0 SPASTIC QUADRIPARESIS SECONDARY TO CEREBRAL PALSY (HCC): Primary | ICD-10-CM

## 2022-08-31 PROCEDURE — G0463 HOSPITAL OUTPT CLINIC VISIT: HCPCS | Performed by: STUDENT IN AN ORGANIZED HEALTH CARE EDUCATION/TRAINING PROGRAM

## 2022-08-31 PROCEDURE — 64643 CHEMODENERV 1 EXTREM 1-4 EA: CPT | Performed by: STUDENT IN AN ORGANIZED HEALTH CARE EDUCATION/TRAINING PROGRAM

## 2022-08-31 PROCEDURE — 64642 CHEMODENERV 1 EXTREMITY 1-4: CPT | Performed by: STUDENT IN AN ORGANIZED HEALTH CARE EDUCATION/TRAINING PROGRAM

## 2022-08-31 RX ORDER — ONABOTULINUMTOXINA 100 [USP'U]/1
320 INJECTION, POWDER, LYOPHILIZED, FOR SOLUTION INTRADERMAL; INTRAMUSCULAR ONCE
Qty: 200 UNITS | Refills: 0
Start: 2022-08-31 | End: 2022-08-31

## 2022-08-31 RX ORDER — ONABOTULINUMTOXINA 100 [USP'U]/1
200 INJECTION, POWDER, LYOPHILIZED, FOR SOLUTION INTRADERMAL; INTRAMUSCULAR ONCE
Qty: 200 UNITS | Refills: 0 | Status: CANCELLED
Start: 2022-08-31 | End: 2022-08-31

## 2022-08-31 NOTE — PROGRESS NOTES
[address] AT [facility]  OFFICE PROCEDURE PROGRESS NOTE        Chart reviewed for the following:   Nely Calderón MD, have reviewed the History, Physical and updated the Allergic reactions for KINGSTON Vásquez performed immediately prior to start of procedure:   Nely Calderón MD, have performed the following reviews on Chloe Simmons prior to the start of the procedure:            * Patient was identified by name and date of birth   * Agreement on procedure being performed was verified  * Risks and Benefits explained to the patient  * Procedure site verified and marked as necessary  * Patient was positioned for comfort  * Consent was signed and verified     Time: 10:30 AM    Date of procedure: 8/31/2022     Lot Number : [id number]  Expires 01/2025  Manufacture: [id number]  NDC: 7949-3185-55    Dosage: 320 units        Procedure performed by:  Rock Balbir MD    Provider assisted by: No one     Patient assisted by: Mother    How tolerated by patient: tolerated the procedure well with no complications          '  Botox Procedure    Indications:  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 Last injection was in June,2022. Botox helps but the UE stiffness gets worse before the next one. On exam, her upper extremities are held in flexion at the elbow, wrist, fingers. Some difficulty extending them. Lower extremities also are stiff with slight movement. Patient nonverbal but follows some commands. In addition to Botox, she is on baclofen 20 mg p.o. 3 times daily. She is getting a total of 320 units of Botox over the upper extremities over the brachioradialis and biceps. No major side effects from previous injections. Procedure:  A total of 320 units of Botox was used.    The medication was injected without EMG guidance as follows: Botox was prepared in usual fashion using 1 mL of normal saline per 50 units of Botox(used 2 of 200 unit Botox). Reconstituted a total of 400 units of Botox for this procedure. I injected both right and left biceps with 30 units in 4 locations(each) for a total of 240 units. I injected both right and left brachial radialis with 20 units in 2 locations each for a total of 80 units. A total of 320 units was injected. The patient tolerated the procedure without any difficulty. Outpatient Encounter Medications as of 8/31/2022   Medication Sig Dispense Refill    diazePAM (Valium) 5 mg tablet Take 1 Tablet by mouth every six (6) hours as needed for Anxiety. Max Daily Amount: 20 mg. spasticity 20 Tablet 3    baclofen (LIORESAL) 20 mg tablet TAKE 1 TABLET BY MOUTH THREE TIMES DAILY 270 Tablet 5    loratadine (CLARITIN) 10 mg tablet Take 10 mg by mouth daily. triamcinolone (NASACORT AQ) 55 mcg nasal inhaler 1 [place] by Nasal route daily. norgestimate-ethinyl estradioL (ORTHO-CYCLEN, SPRINTEC) 0.25-35 mg-mcg tab Take 1 Tab by mouth daily. Patient is to skip the placebo pills at the end of the pack and proceed directly to her new pack in order to take the oral contraception in a continuous fashion 4 Package 4    Blood Pressure Test Kit-Wrist kit 1 Kit by Does Not Apply route daily. 1 Kit 0    trimethoprim-sulfamethoxazole (BACTRIM DS, SEPTRA DS) 160-800 mg per tablet Take 1 Tab by mouth two (2) times a day. cetirizine (ZYRTEC) 10 mg tablet Take  by mouth. No facility-administered encounter medications on file as of 8/31/2022.

## 2022-11-30 ENCOUNTER — OFFICE VISIT (OUTPATIENT)
Dept: NEUROLOGY | Age: 37
End: 2022-11-30
Payer: MEDICARE

## 2022-11-30 VITALS
TEMPERATURE: 98.4 F | RESPIRATION RATE: 18 BRPM | BODY MASS INDEX: 14.16 KG/M2 | SYSTOLIC BLOOD PRESSURE: 118 MMHG | HEART RATE: 121 BPM | HEIGHT: 61 IN | OXYGEN SATURATION: 98 % | WEIGHT: 75 LBS | DIASTOLIC BLOOD PRESSURE: 72 MMHG

## 2022-11-30 DIAGNOSIS — G80.0 SPASTIC QUADRIPARESIS SECONDARY TO CEREBRAL PALSY (HCC): Primary | ICD-10-CM

## 2022-11-30 PROCEDURE — G0463 HOSPITAL OUTPT CLINIC VISIT: HCPCS | Performed by: STUDENT IN AN ORGANIZED HEALTH CARE EDUCATION/TRAINING PROGRAM

## 2022-11-30 PROCEDURE — 64616 CHEMODENERV MUSC NECK DYSTON: CPT | Performed by: STUDENT IN AN ORGANIZED HEALTH CARE EDUCATION/TRAINING PROGRAM

## 2022-11-30 RX ORDER — ONABOTULINUMTOXINA 100 [USP'U]/1
320 INJECTION, POWDER, LYOPHILIZED, FOR SOLUTION INTRADERMAL; INTRAMUSCULAR ONCE
Qty: 200 UNITS | Refills: 0
Start: 2022-11-30 | End: 2022-11-30

## 2022-11-30 NOTE — PROGRESS NOTES
[address] AT [facility]  OFFICE PROCEDURE PROGRESS NOTE        Chart reviewed for the following:   Gee Torre MD, have reviewed the History, Physical and updated the Allergic reactions for KINGSTON Wren 75 performed immediately prior to start of procedure:   Gee Torre MD, have performed the following reviews on Bonifacio Post prior to the start of the procedure:            * Patient was identified by name and date of birth   * Agreement on procedure being performed was verified  * Risks and Benefits explained to the patient  * Procedure site verified and marked as necessary  * Patient was positioned for comfort  * Consent was signed and verified     Time: 10:30 AM    Date of procedure: 11/30/2022     Lot Number : [id number]  Expires 04/2025  Manufacture: [id number]  NDC: 1006-2488-08    Dosage: 320 units        Procedure performed by:  Shannan Irizarry MD    Provider assisted by: No one     Patient assisted by: Mother    How tolerated by patient: tolerated the procedure well with no complications          '  Botox Procedure    Indications:  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 Last injection was in August,2022. Botox helps but the UE stiffness gets worse before the next one(mother claims a month before the next injection). On exam, her upper extremities are held in flexion at the elbow, wrist, fingers. Some difficulty extending them. Lower extremities also are stiff with slight movement. Patient nonverbal but follows some commands. In addition to Botox, she is on baclofen 20 mg p.o. 3 times daily. She is getting a total of 320 units of Botox over the upper extremities over the brachioradialis and biceps. No major side effects from previous injections. Previously, patient has been taking the Botox every 2 months according to her mother. Will try her next one in 10 weeks time.         Procedure:  A total of 320 units of Botox was used. The medication was injected without EMG guidance as follows: Botox was prepared in usual fashion using 1 mL of normal saline per 50 units of Botox(used 2 of 200 unit Botox). Reconstituted a total of 400 units of Botox for this procedure. I injected both right and left biceps with 30 units in 4 locations(each) for a total of 240 units. I injected both right and left brachial radialis with 20 units in 2 locations each for a total of 80 units. A total of 320 units was injected. The patient tolerated the procedure without any difficulty. Outpatient Encounter Medications as of 11/30/2022   Medication Sig Dispense Refill    onabotulinumtoxinA (Botox) 100 unit injection 320 Units by IntraMUSCular route once for 1 dose. 200 Units 0    diazePAM (Valium) 5 mg tablet Take 1 Tablet by mouth every six (6) hours as needed for Anxiety. Max Daily Amount: 20 mg. spasticity 20 Tablet 3    baclofen (LIORESAL) 20 mg tablet TAKE 1 TABLET BY MOUTH THREE TIMES DAILY 270 Tablet 5    loratadine (CLARITIN) 10 mg tablet Take 10 mg by mouth daily. triamcinolone (NASACORT AQ) 55 mcg nasal inhaler 1 [place] by Nasal route daily. norgestimate-ethinyl estradioL (ORTHO-CYCLEN, SPRINTEC) 0.25-35 mg-mcg tab Take 1 Tab by mouth daily. Patient is to skip the placebo pills at the end of the pack and proceed directly to her new pack in order to take the oral contraception in a continuous fashion 4 Package 4    Blood Pressure Test Kit-Wrist kit 1 Kit by Does Not Apply route daily. 1 Kit 0    trimethoprim-sulfamethoxazole (BACTRIM DS, SEPTRA DS) 160-800 mg per tablet Take 1 Tab by mouth two (2) times a day. cetirizine (ZYRTEC) 10 mg tablet Take  by mouth. No facility-administered encounter medications on file as of 11/30/2022.

## 2023-02-15 ENCOUNTER — PROCEDURE VISIT (OUTPATIENT)
Age: 38
End: 2023-02-15
Payer: MEDICARE

## 2023-02-15 VITALS
DIASTOLIC BLOOD PRESSURE: 62 MMHG | SYSTOLIC BLOOD PRESSURE: 90 MMHG | BODY MASS INDEX: 14.17 KG/M2 | HEART RATE: 100 BPM | HEIGHT: 61 IN | RESPIRATION RATE: 22 BRPM | TEMPERATURE: 97.8 F | OXYGEN SATURATION: 98 %

## 2023-02-15 DIAGNOSIS — G80.0 SPASTIC QUADRIPLEGIC CEREBRAL PALSY (HCC): Primary | ICD-10-CM

## 2023-02-15 PROCEDURE — 99214 OFFICE O/P EST MOD 30 MIN: CPT | Performed by: STUDENT IN AN ORGANIZED HEALTH CARE EDUCATION/TRAINING PROGRAM

## 2023-02-15 PROCEDURE — 64643 CHEMODENERV 1 EXTREM 1-4 EA: CPT | Performed by: STUDENT IN AN ORGANIZED HEALTH CARE EDUCATION/TRAINING PROGRAM

## 2023-02-15 PROCEDURE — G8428 CUR MEDS NOT DOCUMENT: HCPCS | Performed by: STUDENT IN AN ORGANIZED HEALTH CARE EDUCATION/TRAINING PROGRAM

## 2023-02-15 PROCEDURE — 1036F TOBACCO NON-USER: CPT | Performed by: STUDENT IN AN ORGANIZED HEALTH CARE EDUCATION/TRAINING PROGRAM

## 2023-02-15 PROCEDURE — G8419 CALC BMI OUT NRM PARAM NOF/U: HCPCS | Performed by: STUDENT IN AN ORGANIZED HEALTH CARE EDUCATION/TRAINING PROGRAM

## 2023-02-15 PROCEDURE — 64642 CHEMODENERV 1 EXTREMITY 1-4: CPT | Performed by: STUDENT IN AN ORGANIZED HEALTH CARE EDUCATION/TRAINING PROGRAM

## 2023-02-15 PROCEDURE — G8484 FLU IMMUNIZE NO ADMIN: HCPCS | Performed by: STUDENT IN AN ORGANIZED HEALTH CARE EDUCATION/TRAINING PROGRAM

## 2023-02-15 RX ORDER — TRIAMCINOLONE ACETONIDE 55 UG/1
1 SPRAY, METERED NASAL DAILY
COMMUNITY

## 2023-02-15 RX ORDER — CETIRIZINE HYDROCHLORIDE 10 MG/1
10 TABLET ORAL DAILY
COMMUNITY

## 2023-02-15 RX ORDER — BACLOFEN 20 MG/1
20 TABLET ORAL 3 TIMES DAILY
COMMUNITY
Start: 2022-06-01

## 2023-02-15 RX ORDER — SULFAMETHOXAZOLE AND TRIMETHOPRIM 800; 160 MG/1; MG/1
1 TABLET ORAL DAILY
COMMUNITY
Start: 2022-12-07 | End: 2023-03-07

## 2023-02-15 NOTE — PROGRESS NOTES
[address] AT [facility]  OFFICE PROCEDURE PROGRESS NOTE        Chart reviewed for the following:   Tiffany Mcintyre MD, have reviewed the History, Physical and updated the Allergic reactions for ROMEO Li performed immediately prior to start of procedure:   Tiffany Mcintyre MD, have performed the following reviews on Amanda Ta prior to the start of the procedure:            * Patient was identified by name and date of birth   * Agreement on procedure being performed was verified  * Risks and Benefits explained to the patient  * Procedure site verified and marked as necessary  * Patient was positioned for comfort  * Consent was signed and verified     Time: 12:00 PM    Date of procedure: 2/15/2023    Lot Number : [id number]  Expires 04/2025  Manufacture: [id number]  NDC: 0471-8419-79    Dosage: 320 units        Procedure performed by:  Kerrie Koehler MD    Provider assisted by: No one     Patient assisted by: Mother    How tolerated by patient: tolerated the procedure well with no complications          '  Botox Procedure    Indications:  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 Last injection was in August,2022. Botox helps but the UE stiffness gets worse before the next one(mother claims a month before the next injection). On exam, her upper extremities are held in flexion at the elbow, wrist, fingers. Some difficulty extending them. Lower extremities also are stiff with slight movement. Patient nonverbal but follows some commands. In addition to Botox, she is on baclofen 20 mg p.o. 3 times daily. She is getting a total of 320 units of Botox over the upper extremities over the brachioradialis and biceps. No major side effects from previous injections. Procedure:  A total of 320 units of Botox was used.    The medication was injected without EMG guidance as follows: Botox was prepared in usual fashion using 1 mL of normal saline per 50 units of Botox(used 2 of 200 unit Botox). Reconstituted a total of 400 units of Botox for this procedure. I injected both right and left biceps with 30 units in 4 locations(each) for a total of 240 units. I injected both right and left brachial radialis with 20 units in 2 locations each for a total of 80 units. A total of 320 units was injected. The patient tolerated the procedure without any difficulty. Outpatient Encounter Medications as of 11/30/2022   Medication Sig Dispense Refill    onabotulinumtoxinA (Botox) 100 unit injection 320 Units by IntraMUSCular route once for 1 dose. 200 Units 0    diazePAM (Valium) 5 mg tablet Take 1 Tablet by mouth every six (6) hours as needed for Anxiety. Max Daily Amount: 20 mg. spasticity 20 Tablet 3    baclofen (LIORESAL) 20 mg tablet TAKE 1 TABLET BY MOUTH THREE TIMES DAILY 270 Tablet 5    loratadine (CLARITIN) 10 mg tablet Take 10 mg by mouth daily. triamcinolone (NASACORT AQ) 55 mcg nasal inhaler 1 [place] by Nasal route daily. norgestimate-ethinyl estradioL (ORTHO-CYCLEN, SPRINTEC) 0.25-35 mg-mcg tab Take 1 Tab by mouth daily. Patient is to skip the placebo pills at the end of the pack and proceed directly to her new pack in order to take the oral contraception in a continuous fashion 4 Package 4    Blood Pressure Test Kit-Wrist kit 1 Kit by Does Not Apply route daily. 1 Kit 0    trimethoprim-sulfamethoxazole (BACTRIM DS, SEPTRA DS) 160-800 mg per tablet Take 1 Tab by mouth two (2) times a day. cetirizine (ZYRTEC) 10 mg tablet Take  by mouth. No facility-administered encounter medications on file as of 11/30/2022.

## 2023-06-07 ENCOUNTER — PROCEDURE VISIT (OUTPATIENT)
Age: 38
End: 2023-06-07
Payer: MEDICARE

## 2023-06-07 VITALS
SYSTOLIC BLOOD PRESSURE: 120 MMHG | TEMPERATURE: 96.9 F | DIASTOLIC BLOOD PRESSURE: 70 MMHG | RESPIRATION RATE: 16 BRPM | OXYGEN SATURATION: 99 % | HEIGHT: 61 IN | HEART RATE: 97 BPM | WEIGHT: 75 LBS | BODY MASS INDEX: 14.16 KG/M2

## 2023-06-07 DIAGNOSIS — G80.0 SPASTIC QUADRIPLEGIC CEREBRAL PALSY (HCC): Primary | ICD-10-CM

## 2023-06-07 RX ADMIN — ONABOTULINUMTOXINA 320 UNITS: 200 INJECTION, POWDER, LYOPHILIZED, FOR SOLUTION INTRADERMAL; INTRAMUSCULAR at 12:15

## 2023-06-07 NOTE — PROGRESS NOTES
[address] AT [facility]  OFFICE PROCEDURE PROGRESS NOTE        Chart reviewed for the following:   Yuko Miller MD, have reviewed the History, Physical and updated the Allergic reactions for ROMEO Li performed immediately prior to start of procedure:   Yuko Miller MD, have performed the following reviews on Freya Albert prior to the start of the procedure:            * Patient was identified by name and date of birth   * Agreement on procedure being performed was verified  * Risks and Benefits explained to the patient  * Procedure site verified and marked as necessary  * Patient was positioned for comfort  * Consent was signed and verified     Time: 11:15 AM    Date of procedure: 6/7/2023    Lot Number : [id number]  Expires 12/2025  Manufacture: [id number]  NDC: 6605-9300-08    Dosage: 320 units        Procedure performed by:  Azeb Gill MD    Provider assisted by: No one     Patient assisted by: Mother    How tolerated by patient: tolerated the procedure well with no complications      '  Botox Procedure    Indications:  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 Last injection was in February 2023. Botox helps but the UE stiffness gets worse before the next one; she was supposed to get her Botox about a month ago. Mother states that has worsening symptoms and the spasticity. On exam, her upper extremities are held in flexion at the elbow, wrist, fingers. Some difficulty extending them. Lower extremities also are stiff with slight movement. Patient nonverbal but follows some commands. In addition to Botox, she is on baclofen 20 mg p.o. 3 times daily. She is getting a total of 320 units of Botox over the upper extremities over the brachioradialis and biceps. No major side effects from previous injections. Procedure:  A total of 320 units of Botox was used.    The medication was injected without EMG

## 2023-06-28 RX ORDER — BACLOFEN 20 MG/1
20 TABLET ORAL 3 TIMES DAILY
Qty: 90 TABLET | Refills: 3 | Status: SHIPPED | OUTPATIENT
Start: 2023-06-28

## 2023-10-04 ENCOUNTER — PROCEDURE VISIT (OUTPATIENT)
Age: 38
End: 2023-10-04
Payer: MEDICARE

## 2023-10-04 VITALS
SYSTOLIC BLOOD PRESSURE: 110 MMHG | HEART RATE: 105 BPM | DIASTOLIC BLOOD PRESSURE: 70 MMHG | WEIGHT: 75 LBS | BODY MASS INDEX: 14.17 KG/M2 | RESPIRATION RATE: 22 BRPM | TEMPERATURE: 97.9 F | OXYGEN SATURATION: 100 %

## 2023-10-04 DIAGNOSIS — G80.0 SPASTIC QUADRIPLEGIC CEREBRAL PALSY (HCC): Primary | ICD-10-CM

## 2023-10-04 PROCEDURE — G8484 FLU IMMUNIZE NO ADMIN: HCPCS | Performed by: STUDENT IN AN ORGANIZED HEALTH CARE EDUCATION/TRAINING PROGRAM

## 2023-10-04 PROCEDURE — G8427 DOCREV CUR MEDS BY ELIG CLIN: HCPCS | Performed by: STUDENT IN AN ORGANIZED HEALTH CARE EDUCATION/TRAINING PROGRAM

## 2023-10-04 PROCEDURE — 99214 OFFICE O/P EST MOD 30 MIN: CPT | Performed by: STUDENT IN AN ORGANIZED HEALTH CARE EDUCATION/TRAINING PROGRAM

## 2023-10-04 PROCEDURE — 1036F TOBACCO NON-USER: CPT | Performed by: STUDENT IN AN ORGANIZED HEALTH CARE EDUCATION/TRAINING PROGRAM

## 2023-10-04 PROCEDURE — PBSHW PBB SHADOW CHARGE: Performed by: STUDENT IN AN ORGANIZED HEALTH CARE EDUCATION/TRAINING PROGRAM

## 2023-10-04 PROCEDURE — G8419 CALC BMI OUT NRM PARAM NOF/U: HCPCS | Performed by: STUDENT IN AN ORGANIZED HEALTH CARE EDUCATION/TRAINING PROGRAM

## 2023-10-04 RX ADMIN — ONABOTULINUMTOXINA 320 UNITS: 200 INJECTION, POWDER, LYOPHILIZED, FOR SOLUTION INTRADERMAL; INTRAMUSCULAR at 12:41

## 2023-10-04 NOTE — PROGRESS NOTES
[address] AT [facility]  OFFICE PROCEDURE PROGRESS NOTE        Chart reviewed for the following:   Sara Sosa MD, have reviewed the History, Physical and updated the Allergic reactions for Carter Rocha performed immediately prior to start of procedure:   Sara Sosa MD, have performed the following reviews on Curtis Ortiz prior to the start of the procedure:            * Patient was identified by name and date of birth   * Agreement on procedure being performed was verified  * Risks and Benefits explained to the patient  * Procedure site verified and marked as necessary  * Patient was positioned for comfort  * Consent was signed and verified     Time: 11:15 AM    Date of procedure: 10/04/2023    Lot Number : [id number]  Expires 02/2026  Manufacture: [facility] [facility]: [id number]    Dosage: 320 units        Procedure performed by:  Jae Gilmore MD    Provider assisted by: No one     Patient assisted by: Mother    How tolerated by patient: tolerated the procedure well with no complications      '  Botox Procedure    Indications:  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 Last injection was in February 2023. Botox helps but the UE stiffness gets worse before the next one; some delay in her botox. Mother states that has worsening symptoms and the spasticity. On exam, her upper extremities are held in flexion at the elbow, wrist, fingers. Some difficulty extending them. Lower extremities also are stiff with slight movement. Patient nonverbal but follows some commands. In addition to Botox, she is on baclofen 20 mg p.o. 3 times daily. She is getting a total of 320 units of Botox over the upper extremities over the brachioradialis and biceps. No major side effects from previous injections. Procedure:  A total of 320 units of Botox was used.    The medication was injected without EMG guidance as follows: Botox was

## 2023-12-05 ENCOUNTER — TELEPHONE (OUTPATIENT)
Age: 38
End: 2023-12-05

## 2023-12-06 RX ORDER — BACLOFEN 20 MG/1
20 TABLET ORAL 3 TIMES DAILY
Qty: 90 TABLET | Refills: 3 | Status: SHIPPED | OUTPATIENT
Start: 2023-12-06

## 2023-12-13 ENCOUNTER — PROCEDURE VISIT (OUTPATIENT)
Age: 38
End: 2023-12-13
Payer: MEDICARE

## 2023-12-13 VITALS — HEIGHT: 61 IN | TEMPERATURE: 98.6 F | HEART RATE: 77 BPM | BODY MASS INDEX: 14.17 KG/M2 | OXYGEN SATURATION: 99 %

## 2023-12-13 DIAGNOSIS — G80.0 SPASTIC QUADRIPLEGIC CEREBRAL PALSY (HCC): Primary | ICD-10-CM

## 2023-12-13 PROCEDURE — 99214 OFFICE O/P EST MOD 30 MIN: CPT

## 2023-12-13 RX ADMIN — ONABOTULINUMTOXINA 320 UNITS: 200 INJECTION, POWDER, LYOPHILIZED, FOR SOLUTION INTRADERMAL; INTRAMUSCULAR at 16:50

## 2023-12-13 NOTE — PROGRESS NOTES
[address] AT [facility]  OFFICE PROCEDURE PROGRESS NOTE        Chart reviewed for the following:   Carlos Valverde MD, have reviewed the History, Physical and updated the Allergic reactions for Carter Rocha performed immediately prior to start of procedure:   Carlos Valverde MD, have performed the following reviews on Modesto Bowens prior to the start of the procedure:            * Patient was identified by name and date of birth   * Agreement on procedure being performed was verified  * Risks and Benefits explained to the patient  * Procedure site verified and marked as necessary  * Patient was positioned for comfort  * Consent was signed and verified     Time: 12:07 AM    Date of procedure:  12/13/23     Lot Number : [id number]  Expires 04/2026  Manufacture: [id number] [facility]: [id number]    Dosage: 320 units        Procedure performed by:  Evelyn Brothers MD    Provider assisted by: No one     Patient assisted by: Mother    How tolerated by patient: tolerated the procedure well with no complications      '  Botox Procedure    Indications:  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 Last injection was in February 2023. Botox helps but the UE stiffness gets worse before the next one; some delay in her botox. Mother states that has worsening symptoms and the spasticity. On exam, her upper extremities are held in flexion at the elbow, wrist, fingers. Some difficulty extending them. Lower extremities also are stiff with slight movement. Patient nonverbal but follows some commands. In addition to Botox, she is on baclofen 20 mg p.o. 3 times daily. She is getting a total of 320 units of Botox over the upper extremities over the brachioradialis and biceps. No major side effects from previous injections. Procedure:  A total of 320 units of Botox was used.    The medication was injected without EMG guidance as follows: Botox was

## 2024-03-13 ENCOUNTER — PROCEDURE VISIT (OUTPATIENT)
Age: 39
End: 2024-03-13
Payer: MEDICARE

## 2024-03-13 VITALS
BODY MASS INDEX: 14.17 KG/M2 | OXYGEN SATURATION: 96 % | HEART RATE: 94 BPM | DIASTOLIC BLOOD PRESSURE: 80 MMHG | HEIGHT: 61 IN | RESPIRATION RATE: 18 BRPM | TEMPERATURE: 98.2 F | SYSTOLIC BLOOD PRESSURE: 138 MMHG

## 2024-03-13 DIAGNOSIS — G80.0 SPASTIC QUADRIPLEGIC CEREBRAL PALSY (HCC): Primary | ICD-10-CM

## 2024-03-13 PROCEDURE — 99213 OFFICE O/P EST LOW 20 MIN: CPT | Performed by: STUDENT IN AN ORGANIZED HEALTH CARE EDUCATION/TRAINING PROGRAM

## 2024-03-13 PROCEDURE — PBSHW PBB SHADOW CHARGE: Performed by: STUDENT IN AN ORGANIZED HEALTH CARE EDUCATION/TRAINING PROGRAM

## 2024-03-13 PROCEDURE — G8484 FLU IMMUNIZE NO ADMIN: HCPCS | Performed by: STUDENT IN AN ORGANIZED HEALTH CARE EDUCATION/TRAINING PROGRAM

## 2024-03-13 PROCEDURE — G8427 DOCREV CUR MEDS BY ELIG CLIN: HCPCS | Performed by: STUDENT IN AN ORGANIZED HEALTH CARE EDUCATION/TRAINING PROGRAM

## 2024-03-13 PROCEDURE — G8419 CALC BMI OUT NRM PARAM NOF/U: HCPCS | Performed by: STUDENT IN AN ORGANIZED HEALTH CARE EDUCATION/TRAINING PROGRAM

## 2024-03-13 PROCEDURE — 1036F TOBACCO NON-USER: CPT | Performed by: STUDENT IN AN ORGANIZED HEALTH CARE EDUCATION/TRAINING PROGRAM

## 2024-03-13 RX ADMIN — ONABOTULINUMTOXINA 320 UNITS: 200 INJECTION, POWDER, LYOPHILIZED, FOR SOLUTION INTRADERMAL; INTRAMUSCULAR at 09:44

## 2024-03-13 NOTE — PROGRESS NOTES
[facility] AT [facility]  OFFICE PROCEDURE PROGRESS NOTE        Chart reviewed for the following:   Chloe BOLANOS MD, have reviewed the History, Physical and updated the Allergic reactions for Carleen Walter     TIME OUT performed immediately prior to start of procedure:   Chloe BOLANOS MD, have performed the following reviews on Carleen Walter prior to the start of the procedure:            * Patient was identified by name and date of birth   * Agreement on procedure being performed was verified  * Risks and Benefits explained to the patient  * Procedure site verified and marked as necessary  * Patient was positioned for comfort  * Consent was signed and verified     Time: 12:07 AM    Date of procedure:  03/13/24     Lot Number : [id number]  Expires 04/2026  Manufacture: [employer]  NDC: 9865-1507-78    Dosage: 320 units        Procedure performed by:  Chloe Leonard MD    Provider assisted by: No one     Patient assisted by: Mother    How tolerated by patient: tolerated the procedure well with no complications      '  Botox Procedure    Indications:  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     Last injection was in  December 2023. Botox helps but the UE stiffness gets worse before the next one  Symptoms are about the same. On exam, her upper extremities are held in flexion at the elbow, wrist, fingers.  Some difficulty extending them.  Lower extremities also are stiff with slight movement.  Patient nonverbal but follows some commands.  In addition to Botox, she is on baclofen 20 mg p.o. 3 times daily.  She has as needed diazepam.           Procedure:  A total of 320 units of Botox was used.   The medication was injected without EMG guidance as follows: Botox was prepared in usual fashion using 1 mL of normal saline per 50 units of Botox(used 2 of 200 unit Botox).  Reconstituted a total of 400 units of Botox for this procedure.  I injected both

## 2024-08-07 ENCOUNTER — OFFICE VISIT (OUTPATIENT)
Age: 39
End: 2024-08-07
Payer: MEDICARE

## 2024-08-07 DIAGNOSIS — G80.0 SPASTIC QUADRIPLEGIC CEREBRAL PALSY (HCC): Primary | ICD-10-CM

## 2024-08-07 PROCEDURE — G8419 CALC BMI OUT NRM PARAM NOF/U: HCPCS | Performed by: PSYCHIATRY & NEUROLOGY

## 2024-08-07 PROCEDURE — 1036F TOBACCO NON-USER: CPT | Performed by: PSYCHIATRY & NEUROLOGY

## 2024-08-07 PROCEDURE — G8427 DOCREV CUR MEDS BY ELIG CLIN: HCPCS | Performed by: PSYCHIATRY & NEUROLOGY

## 2024-08-07 PROCEDURE — 99213 OFFICE O/P EST LOW 20 MIN: CPT | Performed by: PSYCHIATRY & NEUROLOGY

## 2024-08-07 NOTE — PROGRESS NOTES
Carleen Walter is a 39 y.o. female (: 1985) presenting to address:    Chief Complaint   Patient presents with    Procedure     botox       There were no vitals filed for this visit.    Coordination of Care Questionaire:   1. \"Have you been to the ER, urgent care clinic since your last visit?  Hospitalized since your last visit?\" no    2. \"Have you seen or consulted any other health care providers outside of the [facility] since your last visit?\" yes     3. For patients aged 45-75: Has the patient had a colonoscopy / FIT/ Cologuard? na      If the patient is female:    4. For patients aged 40-74: Has the patient had a mammogram within the past 2 years? na      5. For patients aged 21-65: Has the patient had a pap smear? na    Advanced Directive:   1. Do you have an Advanced Directive? yes    2. Would you like information on Advanced Directives? no

## 2024-08-07 NOTE — PROGRESS NOTES
Botox Procedure     Indications:  spasticity of upper limbs from cerebral palsy    Physical exam:   Patient is sitting on the wheel chair, awake  spastic quadriparesis.        Botox  Lot#   [id number]  EXP  2026/06    Saline  Lot # [id number]  EXP: 01-APR-2025    Botox was prepared in usual fashion using 1 mL of normal saline per 100 units of Botox (2 bottles Botox of 200 units each). Reconstituted a total of 400 units of Botox in 4 cc saline for this procedure.      Procedure:  After risks and benefits were explained including bleeding, infection, worsening of pain, damage to the areas being injected, weakness of muscles, loss of muscle control, dysphagia if injecting the head or neck, facial droop if injecting the facial area, painful injection, allergic or other reaction to the medications being injected, and the failure of the procedure to help the problem, a signed consent was obtained.      The patient was placed in a comfortable area and the sites to be treated were identified.  The area to be treated was prepped three times with alcohol and the alcohol allowed to dry.      The Botox was injected without EMG guidance as follows: I injected both right and left biceps with 30 units in 4 locations(each) for a total of 240 units.  I injected both right and left brachial radialis with 20 units in 2 locations each for a total of 80 units.  A total of 320 units was injected.The patient tolerated the procedure without any difficulty. The remaining 80 units were discarded.     CPT code  76119

## 2024-08-07 NOTE — PROGRESS NOTES
Carleen Walter is a 39 y.o. female (: 1985) presenting to address:    Chief Complaint   Patient presents with    Procedure     botox       There were no vitals filed for this visit.    Coordination of Care Questionaire:   1. \"Have you been to the ER, urgent care clinic since your last visit?  Hospitalized since your last visit?\" no    2. \"Have you seen or consulted any other health care providers outside of the [facility] since your last visit?\" yes     3. For patients aged 45-75: Has the patient had a colonoscopy / FIT/ Cologuard? na      If the patient is female:    4. For patients aged 40-74: Has the patient had a mammogram within the past 2 years? na      5. For patients aged 21-65: Has the patient had a pap smear? na    Advanced Directive:   1. Do you have an Advanced Directive? no    2. Would you like information on Advanced Directives? no

## 2024-10-23 ENCOUNTER — OFFICE VISIT (OUTPATIENT)
Age: 39
End: 2024-10-23
Payer: MEDICARE

## 2024-10-23 VITALS
SYSTOLIC BLOOD PRESSURE: 124 MMHG | DIASTOLIC BLOOD PRESSURE: 70 MMHG | HEART RATE: 90 BPM | TEMPERATURE: 98.8 F | BODY MASS INDEX: 14.17 KG/M2 | WEIGHT: 75 LBS

## 2024-10-23 DIAGNOSIS — G80.0 SPASTIC QUADRIPLEGIC CEREBRAL PALSY (HCC): Primary | ICD-10-CM

## 2024-10-23 PROCEDURE — 99213 OFFICE O/P EST LOW 20 MIN: CPT | Performed by: STUDENT IN AN ORGANIZED HEALTH CARE EDUCATION/TRAINING PROGRAM

## 2024-10-23 PROCEDURE — G8484 FLU IMMUNIZE NO ADMIN: HCPCS | Performed by: STUDENT IN AN ORGANIZED HEALTH CARE EDUCATION/TRAINING PROGRAM

## 2024-10-23 PROCEDURE — 1036F TOBACCO NON-USER: CPT | Performed by: STUDENT IN AN ORGANIZED HEALTH CARE EDUCATION/TRAINING PROGRAM

## 2024-10-23 PROCEDURE — G8428 CUR MEDS NOT DOCUMENT: HCPCS | Performed by: STUDENT IN AN ORGANIZED HEALTH CARE EDUCATION/TRAINING PROGRAM

## 2024-10-23 PROCEDURE — 64642 CHEMODENERV 1 EXTREMITY 1-4: CPT | Performed by: STUDENT IN AN ORGANIZED HEALTH CARE EDUCATION/TRAINING PROGRAM

## 2024-10-23 PROCEDURE — 64643 CHEMODENERV 1 EXTREM 1-4 EA: CPT | Performed by: STUDENT IN AN ORGANIZED HEALTH CARE EDUCATION/TRAINING PROGRAM

## 2024-10-23 PROCEDURE — G8419 CALC BMI OUT NRM PARAM NOF/U: HCPCS | Performed by: STUDENT IN AN ORGANIZED HEALTH CARE EDUCATION/TRAINING PROGRAM

## 2024-10-23 RX ORDER — BACLOFEN 20 MG/1
20 TABLET ORAL 3 TIMES DAILY
Qty: 270 TABLET | Refills: 2 | Status: SHIPPED | OUTPATIENT
Start: 2024-10-23 | End: 2025-01-21

## 2024-10-23 RX ORDER — SULFAMETHOXAZOLE AND TRIMETHOPRIM 800; 160 MG/1; MG/1
1 TABLET ORAL DAILY
COMMUNITY

## 2024-10-23 RX ORDER — BACLOFEN 20 MG/1
20 TABLET ORAL 3 TIMES DAILY
COMMUNITY
Start: 2024-08-25 | End: 2024-10-23 | Stop reason: SDUPTHER

## 2024-10-23 RX ORDER — FLUCONAZOLE 150 MG/1
TABLET ORAL
COMMUNITY
Start: 2024-09-29

## 2024-10-23 RX ORDER — LEVONORGESTREL AND ETHINYL ESTRADIOL 100-20(84)
1 KIT ORAL DAILY
COMMUNITY
Start: 2024-08-25

## 2024-10-23 NOTE — PROGRESS NOTES
[facility] AT [facility]  OFFICE PROCEDURE PROGRESS NOTE        Chart reviewed for the following:   Chloe BOLANOS MD, have reviewed the History, Physical and updated the Allergic reactions for Carleen Walter     TIME OUT performed immediately prior to start of procedure:   Chloe BOLANOS MD, have performed the following reviews on Carleen Walter prior to the start of the procedure:            * Patient was identified by name and date of birth   * Agreement on procedure being performed was verified  * Risks and Benefits explained to the patient  * Procedure site verified and marked as necessary  * Patient was positioned for comfort  * Consent was signed and verified     Time: 11:45 AM    Date of procedure:  10/23/24     Lot Number : [id number]  Expires 06/2026  Manufacture: [employer]ley  NDC: 0002-9632-11    Dosage: 320 units        Procedure performed by:  Chloe Leonard MD    Provider assisted by: No one     Patient assisted by: Mother    How tolerated by patient: tolerated the procedure well with no complications      '  Botox Procedure    Indications:  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     Last injection was in  August 2024. Symptoms are about the same; but mother says that the stiffness gets worse before the next injection.  On exam, her upper extremities are held in flexion at the elbow, wrist, fingers.  Some difficulty extending them.  Lower extremities also are stiff with slight movement.  Patient nonverbal but follows some commands.  In addition to Botox, she is on baclofen 20 mg p.o. 3 times daily.  She has as needed diazepam.           Procedure:  A total of 320 units of Botox was used.   The medication was injected without EMG guidance as follows: Botox was prepared in usual fashion using 1 mL of normal saline per 50 units of Botox(used 2 of 200 unit Botox).  Reconstituted a total of 400 units of Botox for this procedure.  I injected

## 2024-12-04 ENCOUNTER — TELEPHONE (OUTPATIENT)
Age: 39
End: 2024-12-04

## 2024-12-09 ENCOUNTER — TELEPHONE (OUTPATIENT)
Age: 39
End: 2024-12-09

## 2024-12-09 DIAGNOSIS — G80.0 SPASTIC QUADRIPLEGIC CEREBRAL PALSY (HCC): Primary | ICD-10-CM

## 2024-12-09 RX ORDER — DIAZEPAM 2 MG/1
2 TABLET ORAL EVERY 8 HOURS PRN
Qty: 20 TABLET | Refills: 0 | Status: SHIPPED | OUTPATIENT
Start: 2024-12-09 | End: 2024-12-29

## 2024-12-09 NOTE — TELEPHONE ENCOUNTER
Mom of patient came by and was concerned that patient had a clamping down episode  and ended up goig to ED-they found nothing and did nothing.   Mom states she noticed the patient clamping down more so she decided to give her 1/2 volum Diabetes Self-Management Education Record    Participant Name: Carleen Walter  Referring Provider: Dixie Kaplan MD    Topics/Learning Objectives Pre-session Assessment  Date:  Instructor initials/date   Post- session Eval Yearly follow-up/reinforcement date Comments   Diabetes disease process & Treatment process:  {Instruction Provided:63268}  {Score:38832} {Score:21127}     Developing strategies for Healthy coping/psychosocial issues:    {Instruction Provided:24276} {Score:82542} {Score:05734}  Date:  - Score:  Confidence Score:    Prevention, detection & treatment of Chronic complications:    {Instruction Provided:00781} {Score:08090} {Score:73345}     Prevention, detection & treatment of acute complications:    {Instruction Provided:38438} {Score:91585} {Score:96054}         Using medications safely:   {Instruction Provided:06956} {Score:72317} {Score:87166}     Insulin/Injectables/glucagon  {Instruction Provided:21526} {Score:79407} {Score:20694}     Monitoring blood glucose, interpreting and using results:   {Instruction Provided:94298} {Score:92146} {Score:46234}  .    Incorporating physical activity into lifestyle:   {Instruction Provided:49962} {Score:07590} {Score:92388}     Incorporating nutritional management into lifestyle:   {Instruction Provided:75678} {Score:63096} {Score:23381}         Developing strategies for problem solving to promote health/change behavior:  {Instruction Provided:72952} {Score:32284} {Score:14150}    []Nutrition  []Monitoring  []Exercise  []Medication  []Other     Identified Barriers to learning/adherence to self management plan:    {Barriers to learnin}      Instruction Method:  {Instruction Method:30296}    Supporting

## 2024-12-09 NOTE — TELEPHONE ENCOUNTER
Mom came by the office and wanted to discuss her daughters recent events.  Patients mom states that awhile after the botox patient had an episode on Nov 18th where she clamped down and it became so bad that they ended up taking her to the ED.   ED could not find cause and did no treatment.  Mom of patient stated she felt she needed to do something to help so she started giving the patient  2.5mg of Valium to help calm her down.  Patients mother stated she was not sure if this should continue giving her the valium or if something else can be given to the patient to calm her so she releases. Patients mom stated patient weighs more than thought and if the Botox is weight base perhaps the patient did not get enough injection which caused this to happen.  Mom was informed Botox is not weight based.  Dr. Leonard in office and was informed of Mom's concerns, prescription for 2mg valium was called into pharmacy for temporary use.  Mom made aware.

## 2024-12-31 ENCOUNTER — TELEPHONE (OUTPATIENT)
Age: 39
End: 2024-12-31

## 2024-12-31 DIAGNOSIS — G80.0 SPASTIC QUADRIPLEGIC CEREBRAL PALSY (HCC): Primary | ICD-10-CM

## 2025-01-14 NOTE — TELEPHONE ENCOUNTER
Patient mom call back regarding previous message. Wanted to know if you could call in Diazepam  2Mg

## 2025-01-15 RX ORDER — DIAZEPAM 2 MG/1
2 TABLET ORAL EVERY 8 HOURS PRN
Qty: 20 TABLET | Refills: 0 | Status: SHIPPED | OUTPATIENT
Start: 2025-01-15 | End: 2025-02-04

## 2025-02-05 ENCOUNTER — OFFICE VISIT (OUTPATIENT)
Age: 40
End: 2025-02-05
Payer: MEDICARE

## 2025-02-05 VITALS
OXYGEN SATURATION: 99 % | HEART RATE: 118 BPM | SYSTOLIC BLOOD PRESSURE: 125 MMHG | DIASTOLIC BLOOD PRESSURE: 82 MMHG | TEMPERATURE: 97 F

## 2025-02-05 DIAGNOSIS — G80.0 SPASTIC QUADRIPLEGIC CEREBRAL PALSY (HCC): Primary | ICD-10-CM

## 2025-02-05 PROCEDURE — G8419 CALC BMI OUT NRM PARAM NOF/U: HCPCS | Performed by: STUDENT IN AN ORGANIZED HEALTH CARE EDUCATION/TRAINING PROGRAM

## 2025-02-05 PROCEDURE — G8428 CUR MEDS NOT DOCUMENT: HCPCS | Performed by: STUDENT IN AN ORGANIZED HEALTH CARE EDUCATION/TRAINING PROGRAM

## 2025-02-05 PROCEDURE — 1036F TOBACCO NON-USER: CPT | Performed by: STUDENT IN AN ORGANIZED HEALTH CARE EDUCATION/TRAINING PROGRAM

## 2025-02-05 PROCEDURE — 99213 OFFICE O/P EST LOW 20 MIN: CPT | Performed by: STUDENT IN AN ORGANIZED HEALTH CARE EDUCATION/TRAINING PROGRAM

## 2025-02-05 NOTE — PROGRESS NOTES
Carleen Walter is a 39 y.o. female (: 1985) presenting to address:    Chief Complaint   Patient presents with    Follow-up     Botox       Medication list and allergies have been reviewed with Carleen Walter and updated as of today's date.     I have gone over all Medical, Surgical and Social History with Carleen Walter and updated/added the information accordingly.       1. Have you been to the ER, Urgent Care or Hospitalized since your last visit? Yes      2. Have you followed up with your PCP or any other Physicians since your procedure/ last office visit?   Yes

## 2025-02-05 NOTE — PROGRESS NOTES
[facility] AT [facility]  OFFICE PROCEDURE PROGRESS NOTE        Chart reviewed for the following:   Chloe BOLANOS MD, have reviewed the History, Physical and updated the Allergic reactions for Carleen Walter     TIME OUT performed immediately prior to start of procedure:   Chloe BOLANOS MD, have performed the following reviews on Carleen Walter prior to the start of the procedure:            * Patient was identified by name and date of birth   * Agreement on procedure being performed was verified  * Risks and Benefits explained to the patient  * Procedure site verified and marked as necessary  * Patient was positioned for comfort  * Consent was signed and verified     Time: 12:00 PM    Date of procedure:  02/05/25     Lot Number [id number]  Expires 09./2026   Manufacture: [employer] Charley  NDC: 2293-8139-01  Dosage: 320 units        Procedure performed by:  Chloe Leonard MD    Provider assisted by: No one     Patient assisted by: Mother    How tolerated by patient: tolerated the procedure well with no complications      '  Botox Procedure    Indications:  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     Last injection was in October 2024.  Mother mentioned episodes of intermittent stiffening and profuse sweating around November.  Evaluated by ID; no obvious infection.  She was given diazepam 2 mg to be taken as needed; she is taking it once a day.  Mother claims that after she missed her Botox last month, she is more stiff: On exam, her upper extremities are held in flexion at the elbow, wrist, fingers.  Some difficulty extending them.  Lower extremities also are stiff with slight movement.  Patient nonverbal but follows some commands.  In addition to Botox, she is on baclofen 20 mg p.o. 3 times daily.  She has as needed diazepam.           Procedure:  A total of 320 units of Botox was used.   The medication was injected without EMG guidance as follows:

## 2025-03-10 ENCOUNTER — TELEPHONE (OUTPATIENT)
Age: 40
End: 2025-03-10

## 2025-03-10 DIAGNOSIS — G80.0 SPASTIC QUADRIPLEGIC CEREBRAL PALSY (HCC): Primary | ICD-10-CM

## 2025-03-10 NOTE — TELEPHONE ENCOUNTER
Patient's mother called in for a prescription:  diazePAM (VALIUM) 2 MG tablet     Pharmacy:  [facility]/pharmacy #[id number] - [place] [id number] - [place] [phone] - F [phone]

## 2025-03-11 RX ORDER — DIAZEPAM 2 MG/1
2 TABLET ORAL EVERY 8 HOURS PRN
Qty: 20 TABLET | Refills: 0 | Status: SHIPPED | OUTPATIENT
Start: 2025-03-11 | End: 2025-03-21

## 2025-04-16 ENCOUNTER — OFFICE VISIT (OUTPATIENT)
Age: 40
End: 2025-04-16

## 2025-04-16 VITALS
HEART RATE: 130 BPM | HEIGHT: 61 IN | OXYGEN SATURATION: 99 % | BODY MASS INDEX: 15.48 KG/M2 | WEIGHT: 82 LBS | TEMPERATURE: 97.3 F | RESPIRATION RATE: 18 BRPM

## 2025-04-16 DIAGNOSIS — G80.0 SPASTIC QUADRIPLEGIC CEREBRAL PALSY (HCC): Primary | ICD-10-CM

## 2025-04-16 RX ORDER — BACLOFEN 20 MG/1
20 TABLET ORAL 3 TIMES DAILY
Qty: 90 TABLET | Refills: 5 | Status: SHIPPED | OUTPATIENT
Start: 2025-04-16 | End: 2025-05-16

## 2025-04-16 RX ORDER — DIAZEPAM 2 MG/1
2 TABLET ORAL EVERY 8 HOURS PRN
Qty: 60 TABLET | Refills: 2 | Status: SHIPPED | OUTPATIENT
Start: 2025-04-16 | End: 2025-05-16

## 2025-04-16 NOTE — PROGRESS NOTES
[facility] AT [facility]  OFFICE PROCEDURE PROGRESS NOTE        Chart reviewed for the following:   Chloe BOLANOS MD, have reviewed the History, Physical and updated the Allergic reactions for Carleen Walter     TIME OUT performed immediately prior to start of procedure:   Chloe BOLANOS MD, have performed the following reviews on Carleen Walter prior to the start of the procedure:            * Patient was identified by name and date of birth   * Agreement on procedure being performed was verified  * Risks and Benefits explained to the patient  * Procedure site verified and marked as necessary  * Patient was positioned for comfort  * Consent was signed and verified     Time: 12:00 PM    Date of procedure:  04/16/25     Lot Number [id number]  Expires 07./2027   Manufacture: [employer] Charley  NDC: 4799-7551-59  Dosage: 340 units        Procedure performed by:  Chloe Leonard MD    Provider assisted by: No one     Patient assisted by: Mother    How tolerated by patient: tolerated the procedure well with no complications      '  Botox Procedure    Indications:  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     Last injection was in October 2024.  Mother mentioned episodes of intermittent stiffening and profuse sweating around November.  Evaluated by ID; no obvious infection.  She was given diazepam 2 mg to be taken as needed; she is taking it once a day.  Mother claims that after she missed her Botox last month, she is more stiff: On exam, her upper extremities are held in flexion at the elbow, wrist, fingers.  Some difficulty extending them.  Lower extremities also are stiff with slight movement.  Patient nonverbal but follows some commands.  In addition to Botox, she is on baclofen 20 mg p.o. 3 times daily.  She has as needed diazepam.           Procedure:  A total of 340 units of Botox was used.   The medication was injected without EMG guidance as follows:

## 2025-07-01 ENCOUNTER — OFFICE VISIT (OUTPATIENT)
Age: 40
End: 2025-07-01
Payer: MEDICARE

## 2025-07-01 VITALS
BODY MASS INDEX: 16.05 KG/M2 | OXYGEN SATURATION: 100 % | SYSTOLIC BLOOD PRESSURE: 127 MMHG | WEIGHT: 85 LBS | HEIGHT: 61 IN | DIASTOLIC BLOOD PRESSURE: 93 MMHG | HEART RATE: 116 BPM

## 2025-07-01 DIAGNOSIS — G80.0 SPASTIC QUADRIPLEGIC CEREBRAL PALSY (HCC): Primary | ICD-10-CM

## 2025-07-01 PROCEDURE — 99213 OFFICE O/P EST LOW 20 MIN: CPT | Performed by: STUDENT IN AN ORGANIZED HEALTH CARE EDUCATION/TRAINING PROGRAM

## 2025-07-01 PROCEDURE — G8419 CALC BMI OUT NRM PARAM NOF/U: HCPCS | Performed by: STUDENT IN AN ORGANIZED HEALTH CARE EDUCATION/TRAINING PROGRAM

## 2025-07-01 PROCEDURE — 1036F TOBACCO NON-USER: CPT | Performed by: STUDENT IN AN ORGANIZED HEALTH CARE EDUCATION/TRAINING PROGRAM

## 2025-07-01 PROCEDURE — 64644 CHEMODENERV 1 EXTREM 5/> MUS: CPT | Performed by: STUDENT IN AN ORGANIZED HEALTH CARE EDUCATION/TRAINING PROGRAM

## 2025-07-01 PROCEDURE — G8427 DOCREV CUR MEDS BY ELIG CLIN: HCPCS | Performed by: STUDENT IN AN ORGANIZED HEALTH CARE EDUCATION/TRAINING PROGRAM

## 2025-07-01 RX ORDER — BACLOFEN 20 MG/1
20 TABLET ORAL 3 TIMES DAILY
Qty: 270 TABLET | Refills: 2 | Status: SHIPPED | OUTPATIENT
Start: 2025-07-01 | End: 2025-09-29

## 2025-07-01 RX ORDER — BACLOFEN 20 MG/1
20 TABLET ORAL 3 TIMES DAILY
COMMUNITY
Start: 2025-05-18 | End: 2025-07-01 | Stop reason: SDUPTHER

## 2025-07-01 NOTE — PROGRESS NOTES
[facility] AT [facility]  OFFICE PROCEDURE PROGRESS NOTE        Chart reviewed for the following:   Chloe BOLANOS MD, have reviewed the History, Physical and updated the Allergic reactions for Carleen Walter     TIME OUT performed immediately prior to start of procedure:   Chloe BOLANOS MD, have performed the following reviews on Carleen Walter prior to the start of the procedure:            * Patient was identified by name and date of birth   * Agreement on procedure being performed was verified  * Risks and Benefits explained to the patient  * Procedure site verified and marked as necessary  * Patient was positioned for comfort  * Consent was signed and verified     Time: 2:10  PM    Date of procedure:  07/01/25     Lot Number [id number]  Expires 10/2027   Manufacture: [employer] Charley  NDC: 1674-6434-36  Dosage: 340 units        Procedure performed by:  Chloe Leonard MD    Provider assisted by: No one     Patient assisted by: Mother    How tolerated by patient: tolerated the procedure well with no complications      '  Botox Procedure    Indications: 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     Her last tube injection was in April 2025.  Came back after 10 weeks.  Feeling less stiff today.  No major side effects from her previous injection.  No skin infections over the upper extremities.  In addition to Botox, she takes baclofen 20 mg p.o. 3 times daily; also has as needed diazepam 2 mg.  On exam, has spastic upper and lower extremities.  Patient on wheelchair.      Procedure:  A total of 340 units of Botox was used.  No EMG guidance. Botox was prepared in usual fashion using 1 mL of normal saline per 50 units of Botox(used 2 of 200 unit Botox).  Reconstituted a total of 400 units of Botox for this procedure.  I injected both right and left biceps with 30 units in 4 locations(each) for a total of 240 units.  I injected both right and left brachial